# Patient Record
Sex: FEMALE | Race: OTHER | HISPANIC OR LATINO | URBAN - METROPOLITAN AREA
[De-identification: names, ages, dates, MRNs, and addresses within clinical notes are randomized per-mention and may not be internally consistent; named-entity substitution may affect disease eponyms.]

---

## 2024-01-18 ENCOUNTER — OUTPATIENT (OUTPATIENT)
Dept: OUTPATIENT SERVICES | Facility: HOSPITAL | Age: 89
LOS: 1 days | End: 2024-01-18
Payer: MEDICARE

## 2024-01-18 ENCOUNTER — RESULT REVIEW (OUTPATIENT)
Age: 89
End: 2024-01-18

## 2024-01-18 ENCOUNTER — APPOINTMENT (OUTPATIENT)
Dept: CT IMAGING | Facility: HOSPITAL | Age: 89
End: 2024-01-18
Payer: MEDICARE

## 2024-01-18 PROBLEM — Z00.00 ENCOUNTER FOR PREVENTIVE HEALTH EXAMINATION: Status: ACTIVE | Noted: 2024-01-18

## 2024-01-18 PROCEDURE — 74174 CTA ABD&PLVS W/CONTRAST: CPT

## 2024-01-18 PROCEDURE — 71275 CT ANGIOGRAPHY CHEST: CPT | Mod: 26,MH

## 2024-01-18 PROCEDURE — 75573 CT HRT C+ STRUX CGEN HRT DS: CPT | Mod: 26,MH

## 2024-01-18 PROCEDURE — 71275 CT ANGIOGRAPHY CHEST: CPT

## 2024-01-18 PROCEDURE — 74174 CTA ABD&PLVS W/CONTRAST: CPT | Mod: 26,MH

## 2024-01-18 PROCEDURE — 75573 CT HRT C+ STRUX CGEN HRT DS: CPT

## 2024-02-05 ENCOUNTER — APPOINTMENT (OUTPATIENT)
Dept: ULTRASOUND IMAGING | Facility: HOSPITAL | Age: 89
End: 2024-02-05

## 2024-02-05 ENCOUNTER — APPOINTMENT (OUTPATIENT)
Dept: CARDIOTHORACIC SURGERY | Facility: CLINIC | Age: 89
End: 2024-02-05
Payer: MEDICARE

## 2024-02-05 ENCOUNTER — OUTPATIENT (OUTPATIENT)
Dept: OUTPATIENT SERVICES | Facility: HOSPITAL | Age: 89
LOS: 1 days | End: 2024-02-05
Payer: MEDICARE

## 2024-02-05 ENCOUNTER — NON-APPOINTMENT (OUTPATIENT)
Age: 89
End: 2024-02-05

## 2024-02-05 VITALS
HEIGHT: 63 IN | SYSTOLIC BLOOD PRESSURE: 147 MMHG | TEMPERATURE: 97.5 F | WEIGHT: 135 LBS | HEART RATE: 64 BPM | OXYGEN SATURATION: 92 % | RESPIRATION RATE: 15 BRPM | BODY MASS INDEX: 23.92 KG/M2 | DIASTOLIC BLOOD PRESSURE: 70 MMHG

## 2024-02-05 DIAGNOSIS — Z87.891 PERSONAL HISTORY OF NICOTINE DEPENDENCE: ICD-10-CM

## 2024-02-05 DIAGNOSIS — Z83.2 FAMILY HISTORY OF DISEASES OF THE BLOOD AND BLOOD-FORMING ORGANS AND CERTAIN DISORDERS INVOLVING THE IMMUNE MECHANISM: ICD-10-CM

## 2024-02-05 DIAGNOSIS — Z87.39 PERSONAL HISTORY OF OTHER DISEASES OF THE MUSCULOSKELETAL SYSTEM AND CONNECTIVE TISSUE: ICD-10-CM

## 2024-02-05 DIAGNOSIS — Z85.828 PERSONAL HISTORY OF OTHER MALIGNANT NEOPLASM OF SKIN: ICD-10-CM

## 2024-02-05 DIAGNOSIS — Z63.4 DISAPPEARANCE AND DEATH OF FAMILY MEMBER: ICD-10-CM

## 2024-02-05 DIAGNOSIS — I35.0 NONRHEUMATIC AORTIC (VALVE) STENOSIS: ICD-10-CM

## 2024-02-05 DIAGNOSIS — Z86.79 PERSONAL HISTORY OF OTHER DISEASES OF THE CIRCULATORY SYSTEM: ICD-10-CM

## 2024-02-05 DIAGNOSIS — Z85.3 PERSONAL HISTORY OF MALIGNANT NEOPLASM OF BREAST: ICD-10-CM

## 2024-02-05 DIAGNOSIS — Z86.69 PERSONAL HISTORY OF OTHER DISEASES OF THE NERVOUS SYSTEM AND SENSE ORGANS: ICD-10-CM

## 2024-02-05 DIAGNOSIS — Z87.898 PERSONAL HISTORY OF OTHER SPECIFIED CONDITIONS: ICD-10-CM

## 2024-02-05 LAB
ALBUMIN SERPL ELPH-MCNC: 3.7 G/DL — SIGNIFICANT CHANGE UP (ref 3.3–5)
ALP SERPL-CCNC: 79 U/L — SIGNIFICANT CHANGE UP (ref 40–120)
ALT FLD-CCNC: 8 U/L — LOW (ref 10–45)
ANION GAP SERPL CALC-SCNC: 8 MMOL/L — SIGNIFICANT CHANGE UP (ref 5–17)
APTT BLD: 33.4 SEC — SIGNIFICANT CHANGE UP (ref 24.5–35.6)
AST SERPL-CCNC: 15 U/L — SIGNIFICANT CHANGE UP (ref 10–40)
BASOPHILS # BLD AUTO: 0.05 K/UL — SIGNIFICANT CHANGE UP (ref 0–0.2)
BASOPHILS NFR BLD AUTO: 0.8 % — SIGNIFICANT CHANGE UP (ref 0–2)
BILIRUB SERPL-MCNC: 0.4 MG/DL — SIGNIFICANT CHANGE UP (ref 0.2–1.2)
BUN SERPL-MCNC: 12 MG/DL — SIGNIFICANT CHANGE UP (ref 7–23)
CALCIUM SERPL-MCNC: 9.4 MG/DL — SIGNIFICANT CHANGE UP (ref 8.4–10.5)
CHLORIDE SERPL-SCNC: 105 MMOL/L — SIGNIFICANT CHANGE UP (ref 96–108)
CO2 SERPL-SCNC: 29 MMOL/L — SIGNIFICANT CHANGE UP (ref 22–31)
CREAT SERPL-MCNC: 0.71 MG/DL — SIGNIFICANT CHANGE UP (ref 0.5–1.3)
EGFR: 81 ML/MIN/1.73M2 — SIGNIFICANT CHANGE UP
EOSINOPHIL # BLD AUTO: 0.28 K/UL — SIGNIFICANT CHANGE UP (ref 0–0.5)
EOSINOPHIL NFR BLD AUTO: 4.3 % — SIGNIFICANT CHANGE UP (ref 0–6)
GLUCOSE SERPL-MCNC: 82 MG/DL — SIGNIFICANT CHANGE UP (ref 70–99)
HCT VFR BLD CALC: 41.6 % — SIGNIFICANT CHANGE UP (ref 34.5–45)
HGB BLD-MCNC: 14.2 G/DL — SIGNIFICANT CHANGE UP (ref 11.5–15.5)
IMM GRANULOCYTES NFR BLD AUTO: 0.3 % — SIGNIFICANT CHANGE UP (ref 0–0.9)
INR BLD: 0.95 — SIGNIFICANT CHANGE UP (ref 0.85–1.18)
LYMPHOCYTES # BLD AUTO: 2.61 K/UL — SIGNIFICANT CHANGE UP (ref 1–3.3)
LYMPHOCYTES # BLD AUTO: 39.7 % — SIGNIFICANT CHANGE UP (ref 13–44)
MCHC RBC-ENTMCNC: 31.8 PG — SIGNIFICANT CHANGE UP (ref 27–34)
MCHC RBC-ENTMCNC: 34.1 GM/DL — SIGNIFICANT CHANGE UP (ref 32–36)
MCV RBC AUTO: 93.3 FL — SIGNIFICANT CHANGE UP (ref 80–100)
MONOCYTES # BLD AUTO: 0.52 K/UL — SIGNIFICANT CHANGE UP (ref 0–0.9)
MONOCYTES NFR BLD AUTO: 7.9 % — SIGNIFICANT CHANGE UP (ref 2–14)
NEUTROPHILS # BLD AUTO: 3.1 K/UL — SIGNIFICANT CHANGE UP (ref 1.8–7.4)
NEUTROPHILS NFR BLD AUTO: 47 % — SIGNIFICANT CHANGE UP (ref 43–77)
NRBC # BLD: 0 /100 WBCS — SIGNIFICANT CHANGE UP (ref 0–0)
NT-PROBNP SERPL-SCNC: 489 PG/ML — HIGH (ref 0–300)
PLATELET # BLD AUTO: 217 K/UL — SIGNIFICANT CHANGE UP (ref 150–400)
POTASSIUM SERPL-MCNC: 4.5 MMOL/L — SIGNIFICANT CHANGE UP (ref 3.5–5.3)
POTASSIUM SERPL-SCNC: 4.5 MMOL/L — SIGNIFICANT CHANGE UP (ref 3.5–5.3)
PROT SERPL-MCNC: 6.6 G/DL — SIGNIFICANT CHANGE UP (ref 6–8.3)
PROTHROM AB SERPL-ACNC: 10.8 SEC — SIGNIFICANT CHANGE UP (ref 9.5–13)
RBC # BLD: 4.46 M/UL — SIGNIFICANT CHANGE UP (ref 3.8–5.2)
RBC # FLD: 13.3 % — SIGNIFICANT CHANGE UP (ref 10.3–14.5)
SODIUM SERPL-SCNC: 142 MMOL/L — SIGNIFICANT CHANGE UP (ref 135–145)
WBC # BLD: 6.58 K/UL — SIGNIFICANT CHANGE UP (ref 3.8–10.5)
WBC # FLD AUTO: 6.58 K/UL — SIGNIFICANT CHANGE UP (ref 3.8–10.5)

## 2024-02-05 PROCEDURE — 80053 COMPREHEN METABOLIC PANEL: CPT

## 2024-02-05 PROCEDURE — 36415 COLL VENOUS BLD VENIPUNCTURE: CPT

## 2024-02-05 PROCEDURE — 86850 RBC ANTIBODY SCREEN: CPT

## 2024-02-05 PROCEDURE — 83880 ASSAY OF NATRIURETIC PEPTIDE: CPT

## 2024-02-05 PROCEDURE — 86900 BLOOD TYPING SEROLOGIC ABO: CPT

## 2024-02-05 PROCEDURE — 85730 THROMBOPLASTIN TIME PARTIAL: CPT

## 2024-02-05 PROCEDURE — 85610 PROTHROMBIN TIME: CPT

## 2024-02-05 PROCEDURE — 85025 COMPLETE CBC W/AUTO DIFF WBC: CPT

## 2024-02-05 PROCEDURE — 99204 OFFICE O/P NEW MOD 45 MIN: CPT

## 2024-02-05 PROCEDURE — 86901 BLOOD TYPING SEROLOGIC RH(D): CPT

## 2024-02-05 SDOH — SOCIAL STABILITY - SOCIAL INSECURITY: DISSAPEARANCE AND DEATH OF FAMILY MEMBER: Z63.4

## 2024-02-08 PROBLEM — Z85.3 HISTORY OF LEFT BREAST CANCER: Status: RESOLVED | Noted: 2024-02-08 | Resolved: 2024-02-08

## 2024-02-08 PROBLEM — Z87.891 FORMER SMOKER: Status: ACTIVE | Noted: 2024-02-08

## 2024-02-08 PROBLEM — Z86.69 HISTORY OF GLAUCOMA: Status: RESOLVED | Noted: 2024-02-08 | Resolved: 2024-02-08

## 2024-02-08 PROBLEM — Z83.2 FAMILY HISTORY OF THROMBOCYTOPENIA: Status: ACTIVE | Noted: 2024-02-08

## 2024-02-08 PROBLEM — Z63.4 WIDOW: Status: ACTIVE | Noted: 2024-02-08

## 2024-02-08 PROBLEM — I35.0 AORTIC STENOSIS: Status: ACTIVE | Noted: 2024-02-08

## 2024-02-08 PROBLEM — Z87.39 HISTORY OF GOUT: Status: RESOLVED | Noted: 2024-02-08 | Resolved: 2024-02-08

## 2024-02-08 PROBLEM — Z86.79 HISTORY OF CORONARY ARTERY DISEASE: Status: RESOLVED | Noted: 2024-02-08 | Resolved: 2024-02-08

## 2024-02-08 PROBLEM — Z87.898 FORMER CONSUMPTION OF ALCOHOL: Status: ACTIVE | Noted: 2024-02-08

## 2024-02-08 PROBLEM — Z85.828 HISTORY OF MALIGNANT NEOPLASM OF SKIN: Status: RESOLVED | Noted: 2024-02-08 | Resolved: 2024-02-08

## 2024-02-08 RX ORDER — TIMOLOL MALEATE 5 MG/ML
0.5 SOLUTION OPHTHALMIC
Refills: 0 | Status: ACTIVE | COMMUNITY

## 2024-02-08 RX ORDER — PREDNISOLONE ACETATE 10 MG/ML
1 SUSPENSION/ DROPS OPHTHALMIC
Refills: 0 | Status: ACTIVE | COMMUNITY

## 2024-02-08 NOTE — RESULTS/DATA
[TextEntry] : - EKG ([facility] 01/08/24): Afib w/HR 99bpm, no ischemic changes - TTE ([facility] 01/09/24): LVEF 55-60%. Mild LVH. Mod MAC. Mild-mod MR. Mod AS (PV 2.95, MG 20.63) but appeared more severe visually. Mild TR. - LHC/RHC ([facility] 01/10/24): LM normal. 40% pLAD stenosis. Calcified 70-80% mLAD stenosis. LI of LCx and RCA (dominant). RA 8, RV 34/7, PA 33/19 (25), PCWP 18, LVEDP 12. . TD CO/CI 5/2.87. Arina CO/CI 5.25/3.02. AV MG 39.5. GREGOR 0.86 cm2. SVI 33 mL/m2 - Structural CT ([facility] 01/18/24): Calcified trileaflet AV. Calcific plaque obscure pLAD lumen, unable to r/o significant stenosis. Remaining segments non-obstructive. - EKG ([facility] 2/4/24): SR rate 64, MT 164ms, QRS 72, QTc 397, no ischemic changes.

## 2024-02-08 NOTE — PLAN
[TextEntry] : - CAD and LFLG severe AS, had long discussion with son and patient regarding disease process.  Pt and son would like to think about it prior to moving forward with a decision.  Procedure itself, perioperative events, risks and benefit discussed.     - recommend proceeding with PCI, staged TAVR if patient and son is amenable.  - continue current medication regimen.  - labs and carotid US today - follow up with Dr. Matias for ongoing cardiology care and discussion of her CAD/LFLG severe AS management/treatment.    I, Dr. Kaylynn Arvizu, personally performed the evaluation and management (E/M) services for this new patient. That E/M includes conducting the clinically appropriate initial history &/or exam, assessing all conditions, and establishing the plan of care. Today, my LOTTIE, was here to observe my evaluation and management service for this patient & follow plan of care established by me going forward.

## 2024-02-08 NOTE — HISTORY OF PRESENT ILLNESS
[Hypertension] : Hypertension [Syncope] : Syncope [Anginal Classification within 2 wks] : Angina over the last 2 weeks [No angina, No symptoms] : No symptoms of [Heart Failure within 2 Weeks] : Heart Failure in last 2 weeks [Class III] : Class III [A fib / A flutter] : A fib / A flutter [Paroxysmal] : Paroxysmal [FreeTextEntry1] : 89 Y female with PMH of HTN, L-breast cancer (s/p mastectomy/chemo 2011), skin cancer, R corneal transplant (2023), w/ admission 1/8-1/13/24 for syncope and was found to have new afib (not on AC) and severe LFLG aortic stenosis referred for surgical consultation of treatment options for aortic stenosis.   Pt was admitted 1/8 after e/o syncope and fall at home. Patient is AAOx3, and able to provide hx w/supplementation by son, Jose. Pt reports she was in the kitchen just finished making pizza for her son. Stove was off and she had finished cleaning up, then she felt that her blood pressure was dropping, followed by a sudden LOC and fell and hit her head. Pt denies any prodrome or symptoms prior to event including CP, palp, SOB. No witness at time of fall. Neighbor came and found her on the ground, and she regained consciousness shortly after. Pt alert and oriented with no neurologic deficit upon arousal. Patient has reported history of hypotension and fainting attacks (last one when she was in [place] 3 years ago when she was suffering from COVID). Patient was admitted to telemetry at [facility] for syncope work up. EKG on admission was consistent with a-fib. Troponin were negative and CT head and CT cervical spine were negative for any acute pathology. TTE on 1/9/24 demonstrated normal LV function but concerning for mod-severe AS. Patient underwent left and right cardiac cath which revealed significant mid LAD lesion and low flow low gradient severe aortic stenosis. It was recommended at that time pt be transferred to [facility] for further workup and possible intervention for AV disease, however pt and son declined as pt was feeling distraught from being in hospital but were amenable to close outpt f/u.  Since discharge, pt has overall been doing well. No further syncopal episodes. Endorses 2-3 block exercise tolerance limited by shortness of breath and fatigue. Has stable BLE edema. Denies CP, palp, SOB at rest, PND, orthopnea, abdominal bloating, fever, chills, dysuria or active dental issue. Mild neck pain after prior fall, seeing pain management specialist for it. Both she and and her son are sad due to the loss of their [place] Terrier that they had for 16 years. Patient is a retired , lives with her son in a first floor apartment. She is a , independent with her basic ADLs and needs help with iADLs, does not use any assist device when walking albeit could benefit from having one. Her son is inquiring for a  that can assist them in getting help for his mom.   Per Jose, they are holding off on starting his mom on anticoagulation for afib until her thrombophilia evaluation has been completed.    [Diabetes Mellitus] : no Diabetes Melllitus [Dyslipidemia] : no dyslipidemia [Dialysis] : no dialysis [Infectious Endocarditis] : no infectious endocarditis [Home Oxygen] : no home oxygen use [Inhaled Medication Therapy] : no inhaled medication therapy [Sleep Apnea] : no sleep apnea [Liver Disease] : no liver disease [Immunocompromise Present] : not immunocompromised [Peripheral Artery Disease] : no peripheral artery disease [Unresponsive Neurologic State] : not in a unresponsive neurologic state [Cerebrovascular Disease] : no cerebrovascular disease [Prior CVA] : with no prior CVA [CVD TIA] : no CVD Tia [Prior Myocardial Infarction] : No prior myocardial infarction [V tach / V fib] :  but no V tach/V fib [2nd Degree Heart Block] : no second degree heart block [Sick Sinus Syndrome] : no sick sinus syndrome [3rd Degree Heart Block] : no third degree heart block

## 2024-02-08 NOTE — PHYSICAL EXAM
[General Appearance - Alert] : alert [General Appearance - In No Acute Distress] : in no acute distress [General Appearance - Well Nourished] : well nourished [General Appearance - Well Developed] : well developed [Sclera] : the sclera and conjunctiva were normal [Respiration, Rhythm And Depth] : normal respiratory rhythm and effort [Exaggerated Use Of Accessory Muscles For Inspiration] : no accessory muscle use [Auscultation Breath Sounds / Voice Sounds] : lungs were clear to auscultation bilaterally [Heart Rate And Rhythm] : heart rate was normal and rhythm regular [Heart Sounds] : normal S1 and S2 [Heart Sounds Gallop] : no gallops [Heart Sounds Pericardial Friction Rub] : no pericardial rub [Systolic grade ___/6] : A grade [unfilled]/6 systolic murmur was heard. [Examination Of The Chest] : the chest was normal in appearance [Chest Visual Inspection Thoracic Asymmetry] : no chest asymmetry [Bowel Sounds] : normal bowel sounds [Abdomen Soft] : soft [Abdomen Tenderness] : non-tender [Abdomen Mass (___ Cm)] : no abdominal mass palpated [Nail Clubbing] : no clubbing  or cyanosis of the fingernails [Skin Color & Pigmentation] : normal skin color and pigmentation [] : no rash [Skin Lesions] : no skin lesions [No Focal Deficits] : no focal deficits [Oriented To Time, Place, And Person] : oriented to person, place, and time [Impaired Insight] : insight and judgment were intact [FreeTextEntry1] : trace BLE edema

## 2024-02-08 NOTE — ASSESSMENT
[FreeTextEntry1] : 89 Y female with PMH of HTN, L-breast cancer (s/p mastectomy/chemo 2011), skin cancer, R corneal transplant (2023), w/ admission 1/8-1/13/24 for syncope and was found to have new afib (not on AC) and severe LFLG aortic stenosis referred for surgical consultation of treatment options for aortic stenosis. Patient is clinically stable; NYHA Class III. Dr. Arvizu have independently seen and evaluated the patient in this face-to-face encounter. Dr. Arvizu have reviewed patient's history and pertinent clinical data. Cardiac catheterization confirmed patient has low flow low gradient severe AS as well as an obstructive mid LAD coronary disease. Treatment options including surgical, transcatheter, and medical therapy were discussed with patient and her son. Dr. Arvizu deemed patient a high surgical risk due to age, frailty and comorbidities and recommends treating her CAD with PCI and her LFLG severe aortic stenosis with staged TAVR procedure after LAD PCI. The TAVR procedure including the risks (including but not limited to bleeding, infection, stroke, need for permanent pacemaker, heart attack, and death), benefits and alternatives were discussed at length with patient and her son. Patient and son wish to think about the procedures some more and is not ready to schedule any procedure at this time. We will check labs work today and carotid US to complete her TAVR work up. They will follow up with Dr. Matias for ongoing cardiology care and further discussion about the treatment of patient's CAD and LFLG severe AS. All questions were answered.

## 2024-04-05 VITALS
SYSTOLIC BLOOD PRESSURE: 185 MMHG | HEIGHT: 63 IN | HEART RATE: 81 BPM | DIASTOLIC BLOOD PRESSURE: 78 MMHG | OXYGEN SATURATION: 95 % | TEMPERATURE: 97 F | RESPIRATION RATE: 16 BRPM | WEIGHT: 141.98 LBS

## 2024-04-05 RX ORDER — SODIUM CHLORIDE 9 MG/ML
1000 INJECTION INTRAMUSCULAR; INTRAVENOUS; SUBCUTANEOUS
Refills: 0 | Status: DISCONTINUED | OUTPATIENT
Start: 2024-04-09 | End: 2024-04-11

## 2024-04-05 RX ORDER — CHLORHEXIDINE GLUCONATE 213 G/1000ML
1 SOLUTION TOPICAL ONCE
Refills: 0 | Status: DISCONTINUED | OUTPATIENT
Start: 2024-04-09 | End: 2024-04-11

## 2024-04-05 NOTE — H&P ADULT - ASSESSMENT
89 yo Faroese/English speaking F, former smoker, w/ a PMH of HTN, pAF on Eliquis (last dose 4/6 PM), CAD (s/p [facility] 1/8/24 w/ significant mLAD @ [facility]), LF/LG severe AS (GREGOR 0.86 by [facility]), Breast CA (s/p mastectomy/chemo in [place] 2011), skin CA s/p resection, Gout, s/p R corneal transplant 2023 (only 20% vision in R eye), and recent hospitalization 1/8-1/13 @ [facility] for syncope (found to have new AF, CAD and AS), initially presented to outpt cardiologist for routine f/u after discharge. Pt endorses continued dizziness but denies any recurrent syncopal episodes since hospitalization. She also endorses generalized fatigued and decreased ET with LE edema. TTE 1/9/24: LVEF 55-60%, mild LVH, mild-mod MR, mod-severe AS (PV 2.95, MG 20.63), mild TR. R+[facility] 1/10/24 @ [facility]: LM normal, pLAD 40%, mLAD 70-80% (calcified), LCx luminal, RCA luminal. RA 8, RV 34/7, PA 33/19 (25), PCPW 18, LVEDP 12, , TD CO/CI 5/2.78, Arina CO/CI 5.25/3.02, severe AS (GREGOR 0.86, MG 39.5, SVI 33). In light of pts risk factors, CCS class II anginal equivalent symptoms and known CAD, pt now presents to [facility] for recommended staged PCI of mLAD w/ BAV, prior to TAVR in May.    -H/H = 14.6/43. Pt denies BRBPR, hematuria, hematochezia, melena. Pt loaded 325mg ASA x1 and Plavix 600mg x1 pre cath   -Cr = _____. EF normal with severe LF/LG AS. 2+ b/l LE edema, lungs CTA. IVF with NS @ 30cc/hr started pre procedure KVO  -Type of sedation: moderate  -Candidate for sedation: yes    88 yo Niuean/English speaking F, former smoker, w/ a PMH of HTN, pAF on Eliquis (last dose 4/6 PM), CAD (s/p [facility] 1/8/24 w/ significant mLAD @ [facility]), LF/LG severe AS (GREGOR 0.86 by [facility]), Breast CA (s/p mastectomy/chemo in [place] 2011), skin CA s/p resection, Gout, s/p R corneal transplant 2023 (only 20% vision in R eye), and recent hospitalization 1/8-1/13 @ [facility] for syncope (found to have new AF, CAD and AS), initially presented to outpt cardiologist for routine f/u after discharge. Pt endorses continued dizziness but denies any recurrent syncopal episodes since hospitalization. She also endorses generalized fatigued and decreased ET with LE edema. TTE 1/9/24: LVEF 55-60%, mild LVH, mild-mod MR, mod-severe AS (PV 2.95, MG 20.63), mild TR. R+[facility] 1/10/24 @ [facility]: LM normal, pLAD 40%, mLAD 70-80% (calcified), LCx luminal, RCA luminal. RA 8, RV 34/7, PA 33/19 (25), PCPW 18, LVEDP 12, , TD CO/CI 5/2.78, Arina CO/CI 5.25/3.02, severe AS (GREGOR 0.86, MG 39.5, SVI 33). In light of pts risk factors, CCS class II anginal equivalent symptoms and known CAD, pt now presents to [facility] for recommended staged PCI of mLAD w/ BAV, prior to TAVR in May.    -H/H = 14.6/43. Pt denies BRBPR, hematuria, hematochezia, melena. Pt loaded 325mg ASA x1 and Plavix 600mg x1 pre cath   -Cr = 0.72. EF normal with severe LF/LG AS. 2+ b/l LE edema, lungs CTA. IVF with NS @ 30cc/hr started pre procedure KVO  -Type of sedation: moderate  -Candidate for sedation: yes

## 2024-04-05 NOTE — H&P ADULT - NSICDXPASTMEDICALHX_GEN_ALL_CORE_FT
PAST MEDICAL HISTORY:  Atrial fibrillation     Breast cancer     CAD (coronary artery disease)     Gout     HTN (hypertension)     Severe aortic stenosis

## 2024-04-05 NOTE — H&P ADULT - NSICDXPASTSURGICALHX_GEN_ALL_CORE_FT
PAST SURGICAL HISTORY:  History of bilateral mastectomy     Post corneal transplant      PAST SURGICAL HISTORY:  History of bilateral mastectomy     Post corneal transplant     S/P appendectomy     S/P skin cancer resection

## 2024-04-05 NOTE — H&P ADULT - HISTORY OF PRESENT ILLNESS
Cardiologist - Dr. Matias  Pharmacy -  [facility] -    89F w/ PMHx of HTN, pAF on Eliquis (last dose ___), CAD (s/p C 1/8/24 w/ significant mLAD @ [facility]), LF/LG severe AS (GREGOR __), Breast CA (s/p mastectomy/chemo 2011), Gout, s/p R corneal transplant 2023 and recent hospitalization 1/8-1/13 @ [facility] for syncope (found to have new AF, CAD and AS), initially presented to outpt cardiologist for routine f/u after discharge. Pt endorses continued dizziness but denies any recurrent syncopal episodes since hospitalization. She also endorses generalized fatigued and decreased ET. Pt denies any ___ CP, palpitations, LE edema, AQUINO, orthopnea, PND, N/V, fever, chills or recent sick contact.  Cardiologist - Dr. Matias  Pharmacy -  [facility] -    89F, Georgian speaking, w/ PMHx of HTN, pAF on Eliquis (last dose 4/6/24), CAD (s/p [facility] 1/8/24 w/ significant mLAD @ [facility]), LF/LG severe AS (GREGOR 0.86 by [facility]), Breast CA (s/p mastectomy/chemo in [place] 2011), Gout, s/p R corneal transplant 2023 and recent hospitalization 1/8-1/13 @ [facility] for syncope (found to have new AF, CAD and AS), initially presented to outpt cardiologist for routine f/u after discharge. Pt endorses continued dizziness but denies any recurrent syncopal episodes since hospitalization. She also endorses generalized fatigued and decreased ET. Pt denies any ___ CP, palpitations, LE edema, AQUINO, orthopnea, PND, N/V, fever, chills or recent sick contact.     TTE 1/9/24: LVEF 55-60%, mild LVH, mild-mod MR, mod-severe AS (PV 2.95, MG 20.63), mild TR  R+[facility] 1/10/24 @ [facility]: LM normal, pLAD 40%, mLAD 70-80% (calcified), LCx luminal, RCA luminal. RA 8, RV 34/7, PA 33/19 (25), PCPW 18, LVEDP 12, , TD CO/CI 5/2.78, Arina CO/CI 5.25/3.02, severe AS (GREGOR 0.86, MG 39.5, SVI 33).    In light of pts risk factors, CCS class __ anginal symptoms and known CAD, pt now presents to [facility] for recommended staged PCI of mLAD w/ BAV, prior to TAVR in May. Cardiologist - Dr. Matias  Pharmacy -  [facility] -    89F, Ukrainian speaking, w/ PMHx of HTN, pAF on Eliquis (last dose ___), CAD (s/p [facility] 1/8/24 w/ significant mLAD @ [facility]), LF/LG severe AS (GREGOR 0.86 by [facility]), Breast CA (s/p mastectomy/chemo in [place] 2011), Gout, s/p R corneal transplant 2023 and recent hospitalization 1/8-1/13 @ [facility] for syncope (found to have new AF, CAD and AS), initially presented to outpt cardiologist for routine f/u after discharge. Pt endorses continued dizziness but denies any recurrent syncopal episodes since hospitalization. She also endorses generalized fatigued and decreased ET. Pt denies any ___ CP, palpitations, LE edema, AQUINO, orthopnea, PND, N/V, fever, chills or recent sick contact.     TTE 1/9/24: LVEF 55-60%, mild LVH, mild-mod MR, mod-severe AS (PV 2.95, MG 20.63), mild TR  R+[facility] 1/10/24 @ [facility]: LM normal, pLAD 40%, mLAD 70-80% (calcified), LCx luminal, RCA luminal. RA 8, RV 34/7, PA 33/19 (25), PCPW 18, LVEDP 12, , TD CO/CI 5/2.78, Arina CO/CI 5.25/3.02, severe AS (GREGOR 0.86, MG 39.5, SVI 33).    In light of pts risk factors, CCS class __ anginal symptoms and known CAD, pt now presents to [facility] for recommended staged PCI of mLAD w/ BAV, prior to TAVR in May. Cardiologist - Dr. Matias  Pharmacy - [facility]  [facility] - Son (Triston)    88 yo Guinean/English speaking F, former smoker, w/ a PMH of HTN, pAF on Eliquis (last dose 4/6 PM), CAD (s/p [facility] 1/8/24 w/ significant mLAD @ [facility]), LF/LG severe AS (GREGOR 0.86 by [facility]), Breast CA (s/p mastectomy/chemo in [place] 2011), skin CA s/p resection, Gout, s/p R corneal transplant 2023 (only 20% vision in R eye), and recent hospitalization 1/8-1/13 @ [facility] for syncope (found to have new AF, CAD and AS), initially presented to outpt cardiologist for routine f/u after discharge. Pt endorses continued dizziness but denies any recurrent syncopal episodes since hospitalization. She also endorses generalized fatigued and decreased ET with LE edema. Pt denies any CP, palpitations, orthopnea, PND, N/V, BRBPR, hematuria. TTE 1/9/24: LVEF 55-60%, mild LVH, mild-mod MR, mod-severe AS (PV 2.95, MG 20.63), mild TR. R+[facility] 1/10/24 @ [facility]: LM normal, pLAD 40%, mLAD 70-80% (calcified), LCx luminal, RCA luminal. RA 8, RV 34/7, PA 33/19 (25), PCPW 18, LVEDP 12, , TD CO/CI 5/2.78, Arina CO/CI 5.25/3.02, severe AS (GREGOR 0.86, MG 39.5, SVI 33). In light of pts risk factors, CCS class II anginal equivalent symptoms and known CAD, pt now presents to [facility] for recommended staged PCI of mLAD w/ BAV, prior to TAVR in May.

## 2024-04-09 ENCOUNTER — INPATIENT (INPATIENT)
Facility: HOSPITAL | Age: 89
LOS: 1 days | Discharge: HOME | DRG: 320 | End: 2024-04-11
Attending: STUDENT IN AN ORGANIZED HEALTH CARE EDUCATION/TRAINING PROGRAM | Admitting: STUDENT IN AN ORGANIZED HEALTH CARE EDUCATION/TRAINING PROGRAM
Payer: MEDICARE

## 2024-04-09 DIAGNOSIS — Z90.13 ACQUIRED ABSENCE OF BILATERAL BREASTS AND NIPPLES: Chronic | ICD-10-CM

## 2024-04-09 DIAGNOSIS — Z94.7 CORNEAL TRANSPLANT STATUS: Chronic | ICD-10-CM

## 2024-04-09 DIAGNOSIS — Z90.49 ACQUIRED ABSENCE OF OTHER SPECIFIED PARTS OF DIGESTIVE TRACT: Chronic | ICD-10-CM

## 2024-04-09 DIAGNOSIS — Z98.890 OTHER SPECIFIED POSTPROCEDURAL STATES: Chronic | ICD-10-CM

## 2024-04-09 LAB
A1C WITH ESTIMATED AVERAGE GLUCOSE RESULT: 5.7 % — HIGH (ref 4–5.6)
ALBUMIN SERPL ELPH-MCNC: 3.3 G/DL — SIGNIFICANT CHANGE UP (ref 3.3–5)
ALBUMIN SERPL ELPH-MCNC: 3.9 G/DL — SIGNIFICANT CHANGE UP (ref 3.3–5)
ALP SERPL-CCNC: 47 U/L — SIGNIFICANT CHANGE UP (ref 40–120)
ALP SERPL-CCNC: 59 U/L — SIGNIFICANT CHANGE UP (ref 40–120)
ALT FLD-CCNC: 11 U/L — SIGNIFICANT CHANGE UP (ref 10–45)
ALT FLD-CCNC: SIGNIFICANT CHANGE UP (ref 10–45)
ANION GAP SERPL CALC-SCNC: 2 MMOL/L — LOW (ref 5–17)
ANION GAP SERPL CALC-SCNC: 8 MMOL/L — SIGNIFICANT CHANGE UP (ref 5–17)
APTT BLD: 32.7 SEC — SIGNIFICANT CHANGE UP (ref 24.5–35.6)
APTT BLD: 33.3 SEC — SIGNIFICANT CHANGE UP (ref 24.5–35.6)
APTT BLD: >200 SEC — CRITICAL HIGH (ref 24.5–35.6)
APTT BLD: >200 SEC — CRITICAL HIGH (ref 24.5–35.6)
AST SERPL-CCNC: 17 U/L — SIGNIFICANT CHANGE UP (ref 10–40)
AST SERPL-CCNC: SIGNIFICANT CHANGE UP (ref 10–40)
BASOPHILS # BLD AUTO: 0.06 K/UL — SIGNIFICANT CHANGE UP (ref 0–0.2)
BASOPHILS NFR BLD AUTO: 0.9 % — SIGNIFICANT CHANGE UP (ref 0–2)
BILIRUB SERPL-MCNC: 0.3 MG/DL — SIGNIFICANT CHANGE UP (ref 0.2–1.2)
BILIRUB SERPL-MCNC: 0.3 MG/DL — SIGNIFICANT CHANGE UP (ref 0.2–1.2)
BUN SERPL-MCNC: 16 MG/DL — SIGNIFICANT CHANGE UP (ref 7–23)
BUN SERPL-MCNC: 17 MG/DL — SIGNIFICANT CHANGE UP (ref 7–23)
CALCIUM SERPL-MCNC: 10 MG/DL — SIGNIFICANT CHANGE UP (ref 8.4–10.5)
CALCIUM SERPL-MCNC: 8.7 MG/DL — SIGNIFICANT CHANGE UP (ref 8.4–10.5)
CHLORIDE SERPL-SCNC: 103 MMOL/L — SIGNIFICANT CHANGE UP (ref 96–108)
CHLORIDE SERPL-SCNC: 104 MMOL/L — SIGNIFICANT CHANGE UP (ref 96–108)
CHOLEST SERPL-MCNC: 237 MG/DL — HIGH
CK MB CFR SERPL CALC: 2.2 NG/ML — SIGNIFICANT CHANGE UP (ref 0–6.7)
CK SERPL-CCNC: 65 U/L — SIGNIFICANT CHANGE UP (ref 25–170)
CO2 SERPL-SCNC: 27 MMOL/L — SIGNIFICANT CHANGE UP (ref 22–31)
CO2 SERPL-SCNC: 27 MMOL/L — SIGNIFICANT CHANGE UP (ref 22–31)
CREAT SERPL-MCNC: 0.66 MG/DL — SIGNIFICANT CHANGE UP (ref 0.5–1.3)
CREAT SERPL-MCNC: 0.72 MG/DL — SIGNIFICANT CHANGE UP (ref 0.5–1.3)
EGFR: 80 ML/MIN/1.73M2 — SIGNIFICANT CHANGE UP
EGFR: 84 ML/MIN/1.73M2 — SIGNIFICANT CHANGE UP
EOSINOPHIL # BLD AUTO: 0.28 K/UL — SIGNIFICANT CHANGE UP (ref 0–0.5)
EOSINOPHIL NFR BLD AUTO: 4.1 % — SIGNIFICANT CHANGE UP (ref 0–6)
ESTIMATED AVERAGE GLUCOSE: 117 MG/DL — HIGH (ref 68–114)
GLUCOSE SERPL-MCNC: 116 MG/DL — HIGH (ref 70–99)
GLUCOSE SERPL-MCNC: 117 MG/DL — HIGH (ref 70–99)
HCT VFR BLD CALC: 37.6 % — SIGNIFICANT CHANGE UP (ref 34.5–45)
HCT VFR BLD CALC: 43 % — SIGNIFICANT CHANGE UP (ref 34.5–45)
HDLC SERPL-MCNC: 59 MG/DL — SIGNIFICANT CHANGE UP
HGB BLD-MCNC: 12.9 G/DL — SIGNIFICANT CHANGE UP (ref 11.5–15.5)
HGB BLD-MCNC: 14.6 G/DL — SIGNIFICANT CHANGE UP (ref 11.5–15.5)
IMM GRANULOCYTES NFR BLD AUTO: 0.3 % — SIGNIFICANT CHANGE UP (ref 0–0.9)
INR BLD: 0.92 — SIGNIFICANT CHANGE UP (ref 0.85–1.18)
INR BLD: 1.08 — SIGNIFICANT CHANGE UP (ref 0.85–1.18)
INR BLD: 1.15 — SIGNIFICANT CHANGE UP (ref 0.85–1.18)
LIPID PNL WITH DIRECT LDL SERPL: 160 MG/DL — HIGH
LYMPHOCYTES # BLD AUTO: 3.38 K/UL — HIGH (ref 1–3.3)
LYMPHOCYTES # BLD AUTO: 49.8 % — HIGH (ref 13–44)
MAGNESIUM SERPL-MCNC: 1.8 MG/DL — SIGNIFICANT CHANGE UP (ref 1.6–2.6)
MAGNESIUM SERPL-MCNC: 2.1 MG/DL — SIGNIFICANT CHANGE UP (ref 1.6–2.6)
MCHC RBC-ENTMCNC: 31.1 PG — SIGNIFICANT CHANGE UP (ref 27–34)
MCHC RBC-ENTMCNC: 31.5 PG — SIGNIFICANT CHANGE UP (ref 27–34)
MCHC RBC-ENTMCNC: 34 GM/DL — SIGNIFICANT CHANGE UP (ref 32–36)
MCHC RBC-ENTMCNC: 34.3 GM/DL — SIGNIFICANT CHANGE UP (ref 32–36)
MCV RBC AUTO: 91.7 FL — SIGNIFICANT CHANGE UP (ref 80–100)
MCV RBC AUTO: 91.9 FL — SIGNIFICANT CHANGE UP (ref 80–100)
MONOCYTES # BLD AUTO: 0.55 K/UL — SIGNIFICANT CHANGE UP (ref 0–0.9)
MONOCYTES NFR BLD AUTO: 8.1 % — SIGNIFICANT CHANGE UP (ref 2–14)
NEUTROPHILS # BLD AUTO: 2.5 K/UL — SIGNIFICANT CHANGE UP (ref 1.8–7.4)
NEUTROPHILS NFR BLD AUTO: 36.8 % — LOW (ref 43–77)
NON HDL CHOLESTEROL: 178 MG/DL — HIGH
NRBC # BLD: 0 /100 WBCS — SIGNIFICANT CHANGE UP (ref 0–0)
NRBC # BLD: 0 /100 WBCS — SIGNIFICANT CHANGE UP (ref 0–0)
PHOSPHATE SERPL-MCNC: 4.1 MG/DL — SIGNIFICANT CHANGE UP (ref 2.5–4.5)
PLATELET # BLD AUTO: 204 K/UL — SIGNIFICANT CHANGE UP (ref 150–400)
PLATELET # BLD AUTO: 225 K/UL — SIGNIFICANT CHANGE UP (ref 150–400)
POCT ISTAT CREATININE: 0.7 MG/DL — SIGNIFICANT CHANGE UP (ref 0.5–1.3)
POTASSIUM SERPL-MCNC: 4.2 MMOL/L — SIGNIFICANT CHANGE UP (ref 3.5–5.3)
POTASSIUM SERPL-MCNC: SIGNIFICANT CHANGE UP (ref 3.5–5.3)
POTASSIUM SERPL-SCNC: 4.2 MMOL/L — SIGNIFICANT CHANGE UP (ref 3.5–5.3)
POTASSIUM SERPL-SCNC: SIGNIFICANT CHANGE UP (ref 3.5–5.3)
PROT SERPL-MCNC: 6 G/DL — SIGNIFICANT CHANGE UP (ref 6–8.3)
PROT SERPL-MCNC: 6.7 G/DL — SIGNIFICANT CHANGE UP (ref 6–8.3)
PROTHROM AB SERPL-ACNC: 10.5 SEC — SIGNIFICANT CHANGE UP (ref 9.5–13)
PROTHROM AB SERPL-ACNC: 12.3 SEC — SIGNIFICANT CHANGE UP (ref 9.5–13)
PROTHROM AB SERPL-ACNC: 13.1 SEC — HIGH (ref 9.5–13)
RBC # BLD: 4.09 M/UL — SIGNIFICANT CHANGE UP (ref 3.8–5.2)
RBC # BLD: 4.69 M/UL — SIGNIFICANT CHANGE UP (ref 3.8–5.2)
RBC # FLD: 14.1 % — SIGNIFICANT CHANGE UP (ref 10.3–14.5)
RBC # FLD: 14.1 % — SIGNIFICANT CHANGE UP (ref 10.3–14.5)
SODIUM SERPL-SCNC: 132 MMOL/L — LOW (ref 135–145)
SODIUM SERPL-SCNC: 139 MMOL/L — SIGNIFICANT CHANGE UP (ref 135–145)
THROMBIN TIME: >300 SEC — HIGH (ref 17.6–24)
TRIGL SERPL-MCNC: 101 MG/DL — SIGNIFICANT CHANGE UP
WBC # BLD: 6.79 K/UL — SIGNIFICANT CHANGE UP (ref 3.8–10.5)
WBC # BLD: 6.8 K/UL — SIGNIFICANT CHANGE UP (ref 3.8–10.5)
WBC # FLD AUTO: 6.79 K/UL — SIGNIFICANT CHANGE UP (ref 3.8–10.5)
WBC # FLD AUTO: 6.8 K/UL — SIGNIFICANT CHANGE UP (ref 3.8–10.5)

## 2024-04-09 PROCEDURE — 93010 ELECTROCARDIOGRAM REPORT: CPT

## 2024-04-09 PROCEDURE — 93458 L HRT ARTERY/VENTRICLE ANGIO: CPT | Mod: 26,59

## 2024-04-09 PROCEDURE — 92928 PRQ TCAT PLMT NTRAC ST 1 LES: CPT | Mod: LD

## 2024-04-09 PROCEDURE — 92986 REVISION OF AORTIC VALVE: CPT

## 2024-04-09 PROCEDURE — 99152 MOD SED SAME PHYS/QHP 5/>YRS: CPT

## 2024-04-09 RX ORDER — ACETAMINOPHEN 500 MG
1000 TABLET ORAL ONCE
Refills: 0 | Status: COMPLETED | OUTPATIENT
Start: 2024-04-09 | End: 2024-04-10

## 2024-04-09 RX ORDER — HYDRALAZINE HCL 50 MG
10 TABLET ORAL ONCE
Refills: 0 | Status: DISCONTINUED | OUTPATIENT
Start: 2024-04-09 | End: 2024-04-09

## 2024-04-09 RX ORDER — HYDRALAZINE HCL 50 MG
10 TABLET ORAL ONCE
Refills: 0 | Status: COMPLETED | OUTPATIENT
Start: 2024-04-09 | End: 2024-04-09

## 2024-04-09 RX ORDER — MAGNESIUM OXIDE 400 MG ORAL TABLET 241.3 MG
800 TABLET ORAL ONCE
Refills: 0 | Status: COMPLETED | OUTPATIENT
Start: 2024-04-09 | End: 2024-04-09

## 2024-04-09 RX ORDER — ASPIRIN/CALCIUM CARB/MAGNESIUM 324 MG
325 TABLET ORAL ONCE
Refills: 0 | Status: COMPLETED | OUTPATIENT
Start: 2024-04-09 | End: 2024-04-09

## 2024-04-09 RX ORDER — CLOPIDOGREL BISULFATE 75 MG/1
600 TABLET, FILM COATED ORAL ONCE
Refills: 0 | Status: COMPLETED | OUTPATIENT
Start: 2024-04-09 | End: 2024-04-09

## 2024-04-09 RX ORDER — METHOCARBAMOL 500 MG/1
2 TABLET, FILM COATED ORAL
Refills: 0 | DISCHARGE

## 2024-04-09 RX ADMIN — Medication 325 MILLIGRAM(S): at 08:16

## 2024-04-09 RX ADMIN — MAGNESIUM OXIDE 400 MG ORAL TABLET 800 MILLIGRAM(S): 241.3 TABLET ORAL at 19:09

## 2024-04-09 RX ADMIN — CLOPIDOGREL BISULFATE 600 MILLIGRAM(S): 75 TABLET, FILM COATED ORAL at 08:16

## 2024-04-09 RX ADMIN — SODIUM CHLORIDE 30 MILLILITER(S): 9 INJECTION INTRAMUSCULAR; INTRAVENOUS; SUBCUTANEOUS at 08:17

## 2024-04-09 RX ADMIN — Medication 10 MILLIGRAM(S): at 12:54

## 2024-04-09 NOTE — PATIENT PROFILE ADULT - FALL HARM RISK - HARM RISK INTERVENTIONS

## 2024-04-09 NOTE — PROGRESS NOTE ADULT - ASSESSMENT
88 y/o Senegalese/English speaking F, former smoker, w/ a PMH of HTN, pAF on Eliquis (last dose 4/6/24 PM), CAD (s/p [facility] 1/8/24 w/ significant mLAD @ [facility]), LF/LG severe AS (GREGOR 0.86 by [facility]), Breast CA (s/p mastectomy/chemo in [place] 2011), skin CA s/p resection, Gout, s/p R corneal transplant 2023 (only 20% vision in R eye), and recent hospitalization 1/8-1/13 @ [facility] for syncope (found to have new AF, CAD and AS), initially presented to outpt cardiologist for routine f/u after discharge. Pt endorses continued dizziness but denies any recurrent syncopal episodes since hospitalization. She also endorses generalized fatigued and decreased ET with LE edema.  TTE 1/9/24: LVEF 55-60%, mild LVH, mild-mod MR, mod-severe AS (PV 2.95, MG 20.63), mild TR. R+[facility] 1/10/24 @ [facility]: LM normal, pLAD 40%, mLAD 70-80% (calcified), LCx luminal, RCA luminal. RA 8, RV 34/7, PA 33/19 (25), PCPW 18, LVEDP 12, , TD CO/CI 5/2.78, Arina CO/CI 5.25/3.02, severe AS (GREGOR 0.86, MG 39.5, SVI 33). In light of pts risk factors, CCS class II anginal equivalent symptoms and known CAD, pt now presents to [facility] for recommended staged PCI of mLAD w/ BAV, prior to TAVR in May.  Pt underwent BAV, PCI to mLAD today.  Had a bundle branch block intra-op so TVP kept in right groin.  F/U post op EKG, if narrow can possibly remove line today.  Got ASA/plavix load today.  Plan will be Eliquis/Plavix to start tomorrow if groin stable.      Neuro:  No focal deficits  Pain meds - IV tylenol PRN    CV:  HR/BP stable.  F/U EKG.  If QRS narrow, D/C line later pending PTT  QRS 70-->122-->108.      Pulm:  O2 stat stable on room air    GI:  NPO for now.  Still very groggy.  Dysphagia screen pending    :  F/U UOP    Heme:  Holding SC heparin due to elevated ptt x 2 post op    Home ?tomorrow

## 2024-04-09 NOTE — PATIENT PROFILE ADULT - FUNCTIONAL ASSESSMENT - BASIC MOBILITY 1.
----- Message from Jeremi Shankar Sr. sent at 6/21/2021 10:25 PM EDT -----  Regarding: Prescription Question  Contact: [phone]  Dr. Alvarez could you order my 90 day prescriptions from [facility].  I couldn't get my prescriptions from [facility] because I recently switched insurance and they said I would have to opt out of [facility] to get my prescriptions at [facility]. I now have [employer] retire[employer] as a dependent on my wife's insurance. My ID #[id number] Thank you  
rx attached  
4 = No assist / stand by assistance

## 2024-04-09 NOTE — PROGRESS NOTE ADULT - SUBJECTIVE AND OBJECTIVE BOX
Operation / Date: 4/9/24 BAV, PCI mLAD    SUBJECTIVE ASSESSMENT:  Pt feels okay.  Groggy but able to follow commands.          Vital Signs Last 24 Hrs  T(C): 36.2 (09 Apr 2024 13:14), Max: 36.2 (09 Apr 2024 13:14)  T(F): 97.1 (09 Apr 2024 13:14), Max: 97.1 (09 Apr 2024 13:14)  HR: 87 (09 Apr 2024 15:00) (72 - 96)  BP: 128/61 (09 Apr 2024 15:00) (128/61 - 191/84)  BP(mean): 88 (09 Apr 2024 15:00) (88 - 120)  RR: 12 (09 Apr 2024 15:00) (11 - 18)  SpO2: 99% (09 Apr 2024 15:00) (92% - 100%)    Parameters below as of 09 Apr 2024 15:00  Patient On (Oxygen Delivery Method): nasal cannula w/ humidification  O2 Flow (L/min): 2    I&O's Detail    09 Apr 2024 07:01  -  09 Apr 2024 15:25  --------------------------------------------------------  IN:    sodium chloride 0.9%: 150 mL  Total IN: 150 mL    OUT:  Total OUT: 0 mL    Total NET: 150 mL      PHYSICAL EXAM:  GEN: NAD, looks comfortable  Psych: Mood appropriate  Neuro: A&Ox3.  No focal deficits.  Moving all extremities.   HEENT: No obvious abnormalities  CV: S1S2, regular, no murmurs appreciated.  No carotid bruits.  No JVD  Lungs: Clear B/L.  No wheezing, rales or rhonchi  ABD: Soft, non-tender, non-distended.  +Bowel sounds  EXT: Warm and well perfused.  No peripheral edema noted  Musculoskeletal: Moving all extremities with normal ROM, no joint swelling  PV: Pedal pulses palpable  Incision Sites: Rt groin TVP in place, site is stable no bleeding.      LABS:                        12.9   6.80  )-----------( 204      ( 09 Apr 2024 11:23 )             37.6       PT/INR - ( 09 Apr 2024 11:23 )   PT: 13.1 sec;   INR: 1.15          PTT - ( 09 Apr 2024 14:09 )  PTT:>200.0 sec    04-09    132<L>  |  103  |  16  ----------------------------<  116<H>  See Note   |  27  |  0.66    Ca    8.7      09 Apr 2024 11:23  Phos  4.1     04-09  Mg     1.8     04-09    TPro  6.0  /  Alb  3.3  /  TBili  0.3  /  DBili  x   /  AST  See Note  /  ALT  See Note  /  AlkPhos  47  04-09      Urinalysis Basic - ( 09 Apr 2024 11:23 )    Color: x / Appearance: x / SG: x / pH: x  Gluc: 116 mg/dL / Ketone: x  / Bili: x / Urobili: x   Blood: x / Protein: x / Nitrite: x   Leuk Esterase: x / RBC: x / WBC x   Sq Epi: x / Non Sq Epi: x / Bacteria: x        MEDICATIONS  (STANDING):  chlorhexidine 4% Liquid 1 Application(s) Topical once  sodium chloride 0.9%. 1000 milliLiter(s) (30 mL/Hr) IV Continuous <Continuous>    MEDICATIONS  (PRN):        RADIOLOGY & ADDITIONAL TESTS:

## 2024-04-10 ENCOUNTER — TRANSCRIPTION ENCOUNTER (OUTPATIENT)
Age: 89
End: 2024-04-10

## 2024-04-10 ENCOUNTER — RESULT REVIEW (OUTPATIENT)
Age: 89
End: 2024-04-10

## 2024-04-10 LAB
ANION GAP SERPL CALC-SCNC: 6 MMOL/L — SIGNIFICANT CHANGE UP (ref 5–17)
APTT BLD: 30.5 SEC — SIGNIFICANT CHANGE UP (ref 24.5–35.6)
BASOPHILS # BLD AUTO: 0.04 K/UL — SIGNIFICANT CHANGE UP (ref 0–0.2)
BASOPHILS NFR BLD AUTO: 0.6 % — SIGNIFICANT CHANGE UP (ref 0–2)
BUN SERPL-MCNC: 15 MG/DL — SIGNIFICANT CHANGE UP (ref 7–23)
CALCIUM SERPL-MCNC: 9.1 MG/DL — SIGNIFICANT CHANGE UP (ref 8.4–10.5)
CHLORIDE SERPL-SCNC: 104 MMOL/L — SIGNIFICANT CHANGE UP (ref 96–108)
CO2 SERPL-SCNC: 30 MMOL/L — SIGNIFICANT CHANGE UP (ref 22–31)
CREAT SERPL-MCNC: 0.83 MG/DL — SIGNIFICANT CHANGE UP (ref 0.5–1.3)
EGFR: 67 ML/MIN/1.73M2 — SIGNIFICANT CHANGE UP
EOSINOPHIL # BLD AUTO: 0.18 K/UL — SIGNIFICANT CHANGE UP (ref 0–0.5)
EOSINOPHIL NFR BLD AUTO: 2.8 % — SIGNIFICANT CHANGE UP (ref 0–6)
GLUCOSE SERPL-MCNC: 108 MG/DL — HIGH (ref 70–99)
HCT VFR BLD CALC: 37.6 % — SIGNIFICANT CHANGE UP (ref 34.5–45)
HGB BLD-MCNC: 12.4 G/DL — SIGNIFICANT CHANGE UP (ref 11.5–15.5)
IMM GRANULOCYTES NFR BLD AUTO: 0.3 % — SIGNIFICANT CHANGE UP (ref 0–0.9)
INR BLD: 1.05 — SIGNIFICANT CHANGE UP (ref 0.85–1.18)
LYMPHOCYTES # BLD AUTO: 1.98 K/UL — SIGNIFICANT CHANGE UP (ref 1–3.3)
LYMPHOCYTES # BLD AUTO: 31 % — SIGNIFICANT CHANGE UP (ref 13–44)
MAGNESIUM SERPL-MCNC: 2 MG/DL — SIGNIFICANT CHANGE UP (ref 1.6–2.6)
MCHC RBC-ENTMCNC: 31.2 PG — SIGNIFICANT CHANGE UP (ref 27–34)
MCHC RBC-ENTMCNC: 33 GM/DL — SIGNIFICANT CHANGE UP (ref 32–36)
MCV RBC AUTO: 94.7 FL — SIGNIFICANT CHANGE UP (ref 80–100)
MONOCYTES # BLD AUTO: 0.5 K/UL — SIGNIFICANT CHANGE UP (ref 0–0.9)
MONOCYTES NFR BLD AUTO: 7.8 % — SIGNIFICANT CHANGE UP (ref 2–14)
NEUTROPHILS # BLD AUTO: 3.67 K/UL — SIGNIFICANT CHANGE UP (ref 1.8–7.4)
NEUTROPHILS NFR BLD AUTO: 57.5 % — SIGNIFICANT CHANGE UP (ref 43–77)
NRBC # BLD: 0 /100 WBCS — SIGNIFICANT CHANGE UP (ref 0–0)
PLATELET # BLD AUTO: 187 K/UL — SIGNIFICANT CHANGE UP (ref 150–400)
POTASSIUM SERPL-MCNC: 4.3 MMOL/L — SIGNIFICANT CHANGE UP (ref 3.5–5.3)
POTASSIUM SERPL-SCNC: 4.3 MMOL/L — SIGNIFICANT CHANGE UP (ref 3.5–5.3)
PROTHROM AB SERPL-ACNC: 12 SEC — SIGNIFICANT CHANGE UP (ref 9.5–13)
RBC # BLD: 3.97 M/UL — SIGNIFICANT CHANGE UP (ref 3.8–5.2)
RBC # FLD: 13.9 % — SIGNIFICANT CHANGE UP (ref 10.3–14.5)
SODIUM SERPL-SCNC: 140 MMOL/L — SIGNIFICANT CHANGE UP (ref 135–145)
WBC # BLD: 6.39 K/UL — SIGNIFICANT CHANGE UP (ref 3.8–10.5)
WBC # FLD AUTO: 6.39 K/UL — SIGNIFICANT CHANGE UP (ref 3.8–10.5)

## 2024-04-10 PROCEDURE — 93306 TTE W/DOPPLER COMPLETE: CPT | Mod: 26

## 2024-04-10 PROCEDURE — 93880 EXTRACRANIAL BILAT STUDY: CPT | Mod: 26

## 2024-04-10 RX ORDER — PANTOPRAZOLE SODIUM 20 MG/1
40 TABLET, DELAYED RELEASE ORAL
Refills: 0 | Status: DISCONTINUED | OUTPATIENT
Start: 2024-04-10 | End: 2024-04-11

## 2024-04-10 RX ORDER — PANTOPRAZOLE SODIUM 20 MG/1
1 TABLET, DELAYED RELEASE ORAL
Qty: 30 | Refills: 0
Start: 2024-04-10 | End: 2024-05-09

## 2024-04-10 RX ORDER — CLOPIDOGREL BISULFATE 75 MG/1
1 TABLET, FILM COATED ORAL
Qty: 30 | Refills: 0
Start: 2024-04-10 | End: 2024-05-09

## 2024-04-10 RX ORDER — ACETAMINOPHEN 500 MG
650 TABLET ORAL EVERY 6 HOURS
Refills: 0 | Status: DISCONTINUED | OUTPATIENT
Start: 2024-04-10 | End: 2024-04-11

## 2024-04-10 RX ORDER — ALBUMIN HUMAN 25 %
250 VIAL (ML) INTRAVENOUS ONCE
Refills: 0 | Status: COMPLETED | OUTPATIENT
Start: 2024-04-10 | End: 2024-04-10

## 2024-04-10 RX ORDER — METOPROLOL TARTRATE 50 MG
0.5 TABLET ORAL
Refills: 0 | DISCHARGE

## 2024-04-10 RX ORDER — APIXABAN 2.5 MG/1
1 TABLET, FILM COATED ORAL
Qty: 60 | Refills: 0
Start: 2024-04-10 | End: 2024-05-09

## 2024-04-10 RX ORDER — APIXABAN 2.5 MG/1
1 TABLET, FILM COATED ORAL
Refills: 0 | DISCHARGE

## 2024-04-10 RX ORDER — ACETAMINOPHEN 500 MG
2 TABLET ORAL
Qty: 56 | Refills: 0
Start: 2024-04-10 | End: 2024-04-16

## 2024-04-10 RX ORDER — TIMOLOL 0.5 %
1 DROPS OPHTHALMIC (EYE) DAILY
Refills: 0 | Status: DISCONTINUED | OUTPATIENT
Start: 2024-04-10 | End: 2024-04-11

## 2024-04-10 RX ORDER — CLOPIDOGREL BISULFATE 75 MG/1
75 TABLET, FILM COATED ORAL DAILY
Refills: 0 | Status: DISCONTINUED | OUTPATIENT
Start: 2024-04-10 | End: 2024-04-11

## 2024-04-10 RX ORDER — APIXABAN 2.5 MG/1
2.5 TABLET, FILM COATED ORAL EVERY 12 HOURS
Refills: 0 | Status: DISCONTINUED | OUTPATIENT
Start: 2024-04-10 | End: 2024-04-11

## 2024-04-10 RX ADMIN — Medication 1 DROP(S): at 12:47

## 2024-04-10 RX ADMIN — Medication 125 MILLILITER(S): at 13:46

## 2024-04-10 RX ADMIN — CLOPIDOGREL BISULFATE 75 MILLIGRAM(S): 75 TABLET, FILM COATED ORAL at 12:47

## 2024-04-10 RX ADMIN — Medication 1000 MILLIGRAM(S): at 03:35

## 2024-04-10 RX ADMIN — APIXABAN 2.5 MILLIGRAM(S): 2.5 TABLET, FILM COATED ORAL at 18:00

## 2024-04-10 RX ADMIN — Medication 125 MILLILITER(S): at 18:01

## 2024-04-10 RX ADMIN — Medication 400 MILLIGRAM(S): at 02:54

## 2024-04-10 NOTE — DISCHARGE NOTE PROVIDER - PROVIDER TOKENS
PROVIDER:[TOKEN:[[id number]]] FREE:[LAST:[Florencio],FIRST:[Pasquale],PHONE:[phone]],FAX:[(   )    -],ADDRESS:[[address] [place] [address]],FOLLOWUP:[1 week]]

## 2024-04-10 NOTE — DISCHARGE NOTE PROVIDER - NSDCCPTREATMENT_GEN_ALL_CORE_FT
PRINCIPAL PROCEDURE  Procedure: Valvuloplasty, aortic valve, balloon  Findings and Treatment:       SECONDARY PROCEDURE  Procedure: Percutaneous coronary angioplasty  Findings and Treatment:

## 2024-04-10 NOTE — DISCHARGE NOTE PROVIDER - CARE PROVIDER_API CALL
Pasquale Matias  Cardiovascular Disease  3rd [place] [address]  Phone: [phone]  Fax: [phone]  Follow Up Time:    Pasquale Matias  [address] [place] [address] Floor Cardiology  [place] [id number]  Phone: [phone]  Fax: (   )    -  Follow Up Time: 1 week

## 2024-04-10 NOTE — CHART NOTE - NSCHARTNOTEFT_GEN_A_CORE
Patient didn't urinated in the TOV period and was bladder scanned with only 250cc in the bladder. She received 2 albumin boluses and passed TOV. Patient will leave with family in the morning.     Rabia Gilbert PA-C

## 2024-04-10 NOTE — DISCHARGE NOTE PROVIDER - NSDCCPCAREPLAN_GEN_ALL_CORE_FT
PRINCIPAL DISCHARGE DIAGNOSIS  Diagnosis: Aortic stenosis  Assessment and Plan of Treatment:      PRINCIPAL DISCHARGE DIAGNOSIS  Diagnosis: Aortic stenosis  Assessment and Plan of Treatment: You underwent a BAV and stent in your heart. You need to follow up with Dr. Matias in 1 week.

## 2024-04-10 NOTE — DISCHARGE NOTE PROVIDER - NSDCMRMEDTOKEN_GEN_ALL_CORE_FT
acetaminophen 325 mg oral tablet: 2 tab(s) orally every 6 hours as needed for Mild Pain (1 - 3)  clopidogrel 75 mg oral tablet: 1 tab(s) orally once a day  Eliquis 5 mg oral tablet: 1 tab(s) orally 2 times a day  pantoprazole 40 mg oral delayed release tablet: 1 tab(s) orally once a day (before a meal)  timolol maleate 0.5% ophthalmic solution: 1 drop(s) in each affected eye 2 times a day   acetaminophen 325 mg oral tablet: 2 tab(s) orally every 6 hours As needed Mild Pain (1 - 3)  clopidogrel 75 mg oral tablet: 1 tab(s) orally once a day  Eliquis 5 mg oral tablet: 1 tab(s) orally 2 times a day  pantoprazole 40 mg oral delayed release tablet: 1 tab(s) orally once a day (before a meal)  timolol maleate 0.5% ophthalmic solution: 1 drop(s) in each affected eye 2 times a day

## 2024-04-10 NOTE — DISCHARGE NOTE PROVIDER - HOSPITAL COURSE
Operation Date: 4/9/24 BAV/ PCI to mid LAD.     Primary Surgeon/Attending MD: Dr. Matias     Referring Physician:   _ _ _ _ _ _ _ _ _ _ _ _  HOSPITAL COURSE:  89 y/o Montserratian/English speaking F, former smoker, w/ a PMH of HTN, pAF on Eliquis (last dose 4/6/24 PM), CAD (s/p [facility] 1/8/24 w/ significant mLAD @ [facility]), LF/LG severe AS (GREGOR 0.86 by [facility]), Breast CA (s/p mastectomy/chemo in [place] 2011), skin CA s/p resection, Gout, s/p R corneal transplant 2023 (only 20% vision in R eye), and recent hospitalization 1/8-1/13 @ [facility] for syncope (found to have new AF, CAD and AS), initially presented to outpt cardiologist for routine f/u after discharge. Pt endorses continued dizziness but denies any recurrent syncopal episodes since hospitalization. She also endorses generalized fatigued and decreased ET with LE edema.  TTE 1/9/24: LVEF 55-60%, mild LVH, mild-mod MR, mod-severe AS (PV 2.95, MG 20.63), mild TR. R+[facility] 1/10/24 @ [facility]: LM normal, pLAD 40%, mLAD 70-80% (calcified), LCx luminal, RCA luminal. RA 8, RV 34/7, PA 33/19 (25), PCPW 18, LVEDP 12, , TD CO/CI 5/2.78, Arina CO/CI 5.25/3.02, severe AS (GREGOR 0.86, MG 39.5, SVI 33). In light of pts risk factors, CCS class II anginal equivalent symptoms and known CAD, pt now presents to [facility] for recommended staged PCI of mLAD w/ BAV, prior to TAVR in May.  Pt underwent BAV, PCI to mLAD today.  Had a bundle branch block intra-op so TVP kept in right groin.  Post op EKG with narrow QRS of 70. Overnight patient required a straight cath for urinary retention. Post-op TTE demonstrated... As per Dr. Lemon patient is ready for discharge home today..  Patient discussed on morning rounds with  _____        _ _ _ _ _ _ _ _ _ _ _ _  DISCHARGE PHYSICAL EXAM:  GEN: NAD, looks comfortable  Neuro: A&Ox3.  No focal deficits.  Moving all extremities.   CV: S1S2, regular, no murmurs appreciated.  No carotid bruits.  No JVD  Lungs: Clear B/L.  No wheezing, rales or rhonchi  ABD: Soft, non-tender, non-distended.  +Bowel sounds  EXT: Warm and well perfused.  No peripheral edema noted. All Distal pulses palpable bilaterally.   Musculoskeletal: Moving all extremities with normal ROM, no joint swelling  Bilateral groins c/d/i no ecchymosis or hematoma.   _ _ _   _ _ _ _ _ _ _ _ _     MEDICATION DISCHARGE CHECKLIST        BAV/PCI        [ ] Aspirin, [  ] Contraindicated, Reason_______________________________        [ x ] Plavix, [ ] Contraindicated, Reason _______________________________        Anticoagulation        [ x ] NOAC, [ ] Reason _______________________________              Cost/Insurance barriers addressed: YES/NO    _ _ _ _ _ _ _ _ _ _ _ _  RELEVANT LABS/IMAGING:    _ _ _ _ _ _ _ _ _ _ _ _    _ _ _ _ _ _ _ _ _ _ _ _  Over 35 minutes was spent with the patient reviewing the discharge material including medications, follow up appointments, recovery, concerning symptoms, and how to contact their health care providers if they have questions     Discussed on morning rounds with Dr. Fonseca.    Operation Date: 4/9/24 BAV/ PCI to mid LAD.     Primary Surgeon/Attending MD: Dr. Matias     Referring Physician: none  _ _ _ _ _ _ _ _ _ _ _ _  HOSPITAL COURSE:  89 y/o Yakut/English speaking F, former smoker, w/ a PMH of HTN, pAF on Eliquis, CAD (s/p LHC 1/8/24 w/ significant mLAD at [facility]), LF/LG severe AS, Breast CA (s/p mastectomy/chemo in [place] 2011), skin CA s/p resection, Gout, s/p R corneal transplant 2023 (only 20% vision in R eye), and recent hospitalization 1/8-1/13 at [facility] for syncope (found to have new AF, CAD and AS), initially presented to outpatient cardiologist for routine follow up after discharge. TTE 1/9/24: LVEF 55-60%, mild LVH, mild-mod MR, mod-severe AS (PV 2.95, MG 20.63), mild TR. R/LHC on 1/10/24 at [facility]: LM normal, pLAD 40%, mLAD 70-80% (calcified), LCx luminal, RCA luminal. She presented to [facility] for recommended staged PCI of mLAD w/ BAV, prior to future TAVR. On 4/9/2024 she underwent a BAV and PCI to mLAD with Dr. Matias. Intraop she had a transient bundle branch block. She arrived to the stepdown unit as a mini ICU with groin TVP in place. POD1 TVP removed. TTE with moderate AS and no pericardial effusion. POD2 Patient feels well, has no complaints. Tolerating PO diet, satting well on room air, ambulating in halls. Will go home with OT. Per Dr. Fonseca she is medically stable for discharge home today, 4/11/24.    _ _ _ _ _ _ _ _ _ _ _ _  DISCHARGE PHYSICAL EXAM:  GEN: NAD, looks comfortable  Neuro: A&Ox3.  No focal deficits.  Moving all extremities.   CV: S1S2, regular, no murmurs appreciated.  No carotid bruits.  No JVD  Lungs: Clear B/L.  No wheezing, rales or rhonchi  ABD: Soft, non-tender, non-distended.  +Bowel sounds  EXT: Warm and well perfused.  No peripheral edema noted. All Distal pulses palpable bilaterally.   Musculoskeletal: Moving all extremities with normal ROM, no joint swelling  Bilateral groins c/d/i no ecchymosis or hematoma.      _ _ _ _ _ _ _ _ _     MEDICATION DISCHARGE CHECKLIST        BAV/PCI        [ ] Aspirin, [  ] Contraindicated, Reason_______________________________        [ x ] Plavix, [ ] Contraindicated, Reason _______________________________        Anticoagulation        [ x ] NOAC, [ ] Reason _______________________________              Cost/Insurance barriers addressed: YES/NO    _ _ _ _ _ _ _ _ _ _ _ _  RELEVANT LABS/IMAGING:    < from: TTE Echo Complete w/ Contrast w/ Doppler (04.10.24 @ 11:19) >    -----  CONCLUSIONS:     1. Normal left ventricular size and systolic function.   2. Mild symmetric left ventricular hypertrophy.   3. Normal right ventricular size and systolic function.   4. Normal atria.   5. Moderate aortic stenosis.   6. Mild tricuspid regurgitation.   7. No evidence of pulmonary hypertension.   8. No pericardial effusion.   9. No prior echo is available for comparison.    < end of copied text >    --------    EQUIPMENT:    [ x] OT  	OUTPATIENT TEAM AWARE: YES    _ _ _ _ _ _ _ _ _ _ _ _  Over 35 minutes was spent with the patient reviewing the discharge material including medications, follow up appointments, recovery, concerning symptoms, and how to contact their health care providers if they have questions.     Discussed on morning rounds with Dr. Fonseca.    Operation Date: 4/9/24 BAV/ PCI to mid LAD.     Primary Surgeon/Attending MD: Dr. Matias     Referring Physician: none  _ _ _ _ _ _ _ _ _ _ _ _  HOSPITAL COURSE:  88 y/o Vietnamese/English speaking F, former smoker, w/ a PMH of HTN, pAF on Eliquis, CAD (s/p LHC 1/8/24 w/ significant mLAD at [facility]), LF/LG severe AS, Breast CA (s/p mastectomy/chemo in [place] 2011), skin CA s/p resection, Gout, s/p R corneal transplant 2023 (only 20% vision in R eye), and recent hospitalization 1/8-1/13 at [facility] for syncope (found to have new AF, CAD and AS), initially presented to outpatient cardiologist for routine follow up after discharge. TTE 1/9/24: LVEF 55-60%, mild LVH, mild-mod MR, mod-severe AS (PV 2.95, MG 20.63), mild TR. R/LHC on 1/10/24 at [facility]: LM normal, pLAD 40%, mLAD 70-80% (calcified), LCx luminal, RCA luminal. She presented to [facility] for recommended staged PCI of mLAD w/ BAV, prior to future TAVR. On 4/9/2024 she underwent a BAV and PCI to mLAD with Dr. Matias. Intraop she had a transient bundle branch block. She arrived to the stepdown unit as a mini ICU with groin TVPP in place. POD1 TVP removed. TTE with moderate AS and no pericardial effusion. POD2 Patient feels well, has no complaints. Tolerating PO diet, satting well on room air, ambulating in halls. Will go home with OT. Per Dr. Fonseca she is medically stable for discharge home today, 4/11/24.     _ _ _ _ _ _ _ _ _ _ _ _  DISCHARGE PHYSICAL EXAM:  GEN: NAD, looks comfortable in bed. Speaking in complete sentences  Neuro: A&Ox3.  No focal deficits.  Moving all extremities.   CV: S1S2, regular, no murmurs appreciated.  No carotid bruits.  No JVD  Lungs: Clear B/L.  No wheezing, rales or rhonchi  ABD: Soft, non-tender, non-distended.  +Bowel sounds  EXT: Warm and well perfused. trace peripheral edema noted. All Distal pulses palpable bilaterally.   Musculoskeletal: Moving all extremities with normal ROM, no joint swelling  Bilateral groins c/d/i no ecchymosis or hematoma.      _ _ _ _ _ _ _ _ _     MEDICATION DISCHARGE CHECKLIST        BAV/PCI        [ ] Aspirin, [  ] Contraindicated, Reason_______________________________        [ x ] Plavix, [ ] Contraindicated, Reason _______________________________        Anticoagulation        [ x ] NOAC, [ ] Reason ___atrial fibrillation____________________________              Cost/Insurance barriers addressed: YES on preop    _ _ _ _ _ _ _ _ _ _ _ _  RELEVANT LABS/IMAGING:    < from: TTE Echo Complete w/ Contrast w/ Doppler (04.10.24 @ 11:19) >    -----  CONCLUSIONS:     1. Normal left ventricular size and systolic function.   2. Mild symmetric left ventricular hypertrophy.   3. Normal right ventricular size and systolic function.   4. Normal atria.   5. Moderate aortic stenosis.   6. Mild tricuspid regurgitation.   7. No evidence of pulmonary hypertension.   8. No pericardial effusion.   9. No prior echo is available for comparison.    < end of copied text >    < from: US Duplex Carotid Arteries Complete, Bilateral (04.10.24 @ 19:15) >    IMPRESSION:  1.  Hemodynamically significant stenosis of the left internal carotid   artery, greater than 70%.  2.  No significant hemodynamic stenosis of the right internal carotid   artery.  3.  Elevated velocities of the bilateral ECA, most likely stenosis.    < end of copied text >        --------    EQUIPMENT:    [ x] [facility]  	OUTPATIENT TEAM AWARE: YES    _ _ _ _ _ _ _ _ _ _ _ _  Over 35 minutes was spent with the patient reviewing the discharge material including medications, follow up appointments, recovery, concerning symptoms, and how to contact their health care providers if they have questions.

## 2024-04-10 NOTE — DISCHARGE NOTE PROVIDER - NSDCFUADDINST_GEN_ALL_CORE_FT
-Walk daily as tolerated and use your incentive spirometer 10 times every hour while you are awake.     -Please weigh yourself daily. If you notice over a 3 pound weight gain in 3 days, this is a sign you are likely retaining too much fluid. It is imperative you call our right away with unexplained rapid weight gain.      -Please continue to wear the compression stockings given to you in the hospital at home. This is a way to prevent fluid from building up in your legs.     -No driving or strenuous activity/exercise until cleared by your surgeon.    -Gently clean your incisions with unscented/antibacterial soap and water, pat dry.  You may leave them open to air.    -Call your doctor if you have shortness of breath, chest pain not relieved by pain medication, dizziness, fever >100.5, or increased redness or drainage from incisions.    You had a MCOT monitor (an external cardiac rhythm monitoring device) placed on your day of discharge.  This helps us monitor your heart while you are out of the hospital for 30 days after discharge. Should your heart go into an abnormal or dangerous rhythm you will receieve a call from the MCOT team and your Structural Heart team of Doctors and PA's will be notified.    1. Keep the monitor within 30 feet of you at all times.  2. When you feel any symptom (chest pain, dizziness, palpitations, weakness, fatigue or anything outside of your normal), press the “Record Symptoms” button on the main phone of your phone  3. Shower or exercise as normal whilewearing the MCOT Patch. Do not swim or take a bath. Patch is water-resistant, not waterproof  4. When the battery is low on the phone or on the device, use the supplied . The monitor will show a warning message when the battery is low.  5. Do not remove the patch from yourskin after you begin monitoring. With normal wear, each patch should last 5 days. To replace the patch follow instructions in the MCOT box with the Patch Guide  6. Any issues with the MCOT device or phone please call Customer Service at [phone].  7. If you have any other questions at all please call the Structural Heart office at [phone]   You had a MCOT monitor (an external cardiac rhythm monitoring device) placed on your day of discharge.  This helps us monitor your heart while you are out of the hospital for 30 days after discharge. Should your heart go into an abnormal or dangerous rhythm you will receieve a call from the MCOT team and your Structural Heart team of Doctors and PA's will be notified.    1. Keep the monitor within 30 feet of you at all times.  2. When you feel any symptom (chest pain, dizziness, palpitations, weakness, fatigue or anything outside of your normal), press the “Record Symptoms” button on the main phone of your phone  3. Shower or exercise as normal whilewearing the MCOT Patch. Do not swim or take a bath. Patch is water-resistant, not waterproof  4. When the battery is low on the phone or on the device, use the supplied . The monitor will show a warning message when the battery is low.  5. Do not remove the patch from yourskin after you begin monitoring. With normal wear, each patch should last 5 days. To replace the patch follow instructions in the MCOT box with the Patch Guide  6. Any issues with the MCOT device or phone please call Customer Service at [phone].  7. If you have any other questions at all please call the Structural Heart office at [phone]     -Walk daily as tolerated and use your incentive spirometer 10 times every hour while you are awake.     -Please weigh yourself daily. If you notice over a 3 pound weight gain in 3 days, this is a sign you are likely retaining too much fluid. It is imperative you call our right away with unexplained rapid weight gain.      -Please continue to wear the compression stockings given to you in the hospital at home. This is a way to prevent fluid from building up in your legs.     -No driving or strenuous activity/exercise until cleared by your surgeon.    -Gently clean your incisions with unscented/antibacterial soap and water, pat dry.  You may leave them open to air.    -Call your doctor if you have shortness of breath, chest pain not relieved by pain medication, dizziness, fever >100.5, or increased redness or drainage from incisions.    You had a MCOT monitor (an external cardiac rhythm monitoring device) placed on your day of discharge.  This helps us monitor your heart while you are out of the hospital for 30 days after discharge. Should your heart go into an abnormal or dangerous rhythm you will receieve a call from the MCOT team and your Structural Heart team of Doctors and PA's will be notified.    1. Keep the monitor within 30 feet of you at all times.  2. When you feel any symptom (chest pain, dizziness, palpitations, weakness, fatigue or anything outside of your normal), press the “Record Symptoms” button on the main phone of your phone  3. Shower or exercise as normal whilewearing the MCOT Patch. Do not swim or take a bath. Patch is water-resistant, not waterproof  4. When the battery is low on the phone or on the device, use the supplied . The monitor will show a warning message when the battery is low.  5. Do not remove the patch from yourskin after you begin monitoring. With normal wear, each patch should last 5 days. To replace the patch follow instructions in the MCOT box with the Patch Guide  6. Any issues with the MCOT device or phone please call [facility] Service at [phone].  7. If you have any other questions at all please call the Structural Heart office at [phone]   You had a MCOT monitor (an external cardiac rhythm monitoring device) placed on your day of discharge.  This helps us monitor your heart while you are out of the hospital for 30 days after discharge. Should your heart go into an abnormal or dangerous rhythm you will receieve a call from the MCOT team and your Structural Heart team of Doctors and PA's will be notified.    1. Keep the monitor within 30 feet of you at all times.  2. When you feel any symptom (chest pain, dizziness, palpitations, weakness, fatigue or anything outside of your normal), press the “Record Symptoms” button on the main phone of your phone  3. Shower or exercise as normal whilewearing the MCOT Patch. Do not swim or take a bath. Patch is water-resistant, not waterproof  4. When the battery is low on the phone or on the device, use the supplied . The monitor will show a warning message when the battery is low.  5. Do not remove the patch from yourskin after you begin monitoring. With normal wear, each patch should last 5 days. To replace the patch follow instructions in the MCOT box with the Patch Guide  6. Any issues with the MCOT device or phone please call Customer Service at [phone].  7. If you have any other questions at all please call the Structural Heart office at [phone]     -Walk daily as tolerated and use your incentive spirometer 10 times every hour while you are awake.     -Please continue to wear the compression stockings given to you in the hospital at home. This is a way to prevent fluid from building up in your legs.     -No driving or strenuous activity/exercise until cleared by your surgeon.    -Gently clean your incisions with unscented/antibacterial soap and water, pat dry.  You may leave them open to air.    -Call your doctor if you have shortness of breath, chest pain not relieved by pain medication, dizziness, fever >100.5, or increased redness or drainage from incisions.

## 2024-04-10 NOTE — DISCHARGE NOTE PROVIDER - NSDCFUADDAPPT_GEN_ALL_CORE_FT
-Our office will call you with your follow up appointments. If you do not hear from them by Monday, call them at [phone].

## 2024-04-11 ENCOUNTER — TRANSCRIPTION ENCOUNTER (OUTPATIENT)
Age: 89
End: 2024-04-11

## 2024-04-11 VITALS
RESPIRATION RATE: 18 BRPM | OXYGEN SATURATION: 94 % | HEART RATE: 88 BPM | DIASTOLIC BLOOD PRESSURE: 58 MMHG | SYSTOLIC BLOOD PRESSURE: 115 MMHG

## 2024-04-11 LAB
ALBUMIN SERPL ELPH-MCNC: 3.9 G/DL — SIGNIFICANT CHANGE UP (ref 3.3–5)
ALP SERPL-CCNC: 52 U/L — SIGNIFICANT CHANGE UP (ref 40–120)
ALT FLD-CCNC: 6 U/L — LOW (ref 10–45)
ANION GAP SERPL CALC-SCNC: 10 MMOL/L — SIGNIFICANT CHANGE UP (ref 5–17)
APTT BLD: 35.9 SEC — HIGH (ref 24.5–35.6)
AST SERPL-CCNC: 14 U/L — SIGNIFICANT CHANGE UP (ref 10–40)
BILIRUB SERPL-MCNC: 0.5 MG/DL — SIGNIFICANT CHANGE UP (ref 0.2–1.2)
BUN SERPL-MCNC: 11 MG/DL — SIGNIFICANT CHANGE UP (ref 7–23)
CALCIUM SERPL-MCNC: 9 MG/DL — SIGNIFICANT CHANGE UP (ref 8.4–10.5)
CHLORIDE SERPL-SCNC: 103 MMOL/L — SIGNIFICANT CHANGE UP (ref 96–108)
CO2 SERPL-SCNC: 27 MMOL/L — SIGNIFICANT CHANGE UP (ref 22–31)
CREAT SERPL-MCNC: 0.66 MG/DL — SIGNIFICANT CHANGE UP (ref 0.5–1.3)
EGFR: 84 ML/MIN/1.73M2 — SIGNIFICANT CHANGE UP
GLUCOSE SERPL-MCNC: 106 MG/DL — HIGH (ref 70–99)
HCT VFR BLD CALC: 36 % — SIGNIFICANT CHANGE UP (ref 34.5–45)
HGB BLD-MCNC: 11.8 G/DL — SIGNIFICANT CHANGE UP (ref 11.5–15.5)
INR BLD: 1.32 — HIGH (ref 0.85–1.18)
MAGNESIUM SERPL-MCNC: 1.9 MG/DL — SIGNIFICANT CHANGE UP (ref 1.6–2.6)
MCHC RBC-ENTMCNC: 30.9 PG — SIGNIFICANT CHANGE UP (ref 27–34)
MCHC RBC-ENTMCNC: 32.8 GM/DL — SIGNIFICANT CHANGE UP (ref 32–36)
MCV RBC AUTO: 94.2 FL — SIGNIFICANT CHANGE UP (ref 80–100)
NRBC # BLD: 0 /100 WBCS — SIGNIFICANT CHANGE UP (ref 0–0)
PHOSPHATE SERPL-MCNC: 2.8 MG/DL — SIGNIFICANT CHANGE UP (ref 2.5–4.5)
PLATELET # BLD AUTO: 171 K/UL — SIGNIFICANT CHANGE UP (ref 150–400)
POTASSIUM SERPL-MCNC: 4 MMOL/L — SIGNIFICANT CHANGE UP (ref 3.5–5.3)
POTASSIUM SERPL-SCNC: 4 MMOL/L — SIGNIFICANT CHANGE UP (ref 3.5–5.3)
PROT SERPL-MCNC: 6.1 G/DL — SIGNIFICANT CHANGE UP (ref 6–8.3)
PROTHROM AB SERPL-ACNC: 14.9 SEC — HIGH (ref 9.5–13)
RBC # BLD: 3.82 M/UL — SIGNIFICANT CHANGE UP (ref 3.8–5.2)
RBC # FLD: 13.8 % — SIGNIFICANT CHANGE UP (ref 10.3–14.5)
SODIUM SERPL-SCNC: 140 MMOL/L — SIGNIFICANT CHANGE UP (ref 135–145)
WBC # BLD: 6.56 K/UL — SIGNIFICANT CHANGE UP (ref 3.8–10.5)
WBC # FLD AUTO: 6.56 K/UL — SIGNIFICANT CHANGE UP (ref 3.8–10.5)

## 2024-04-11 PROCEDURE — 80048 BASIC METABOLIC PNL TOTAL CA: CPT

## 2024-04-11 PROCEDURE — 82565 ASSAY OF CREATININE: CPT

## 2024-04-11 PROCEDURE — 36415 COLL VENOUS BLD VENIPUNCTURE: CPT

## 2024-04-11 PROCEDURE — 85610 PROTHROMBIN TIME: CPT

## 2024-04-11 PROCEDURE — 93005 ELECTROCARDIOGRAM TRACING: CPT

## 2024-04-11 PROCEDURE — P9045: CPT

## 2024-04-11 PROCEDURE — 80053 COMPREHEN METABOLIC PANEL: CPT

## 2024-04-11 PROCEDURE — 85670 THROMBIN TIME PLASMA: CPT

## 2024-04-11 PROCEDURE — 93880 EXTRACRANIAL BILAT STUDY: CPT

## 2024-04-11 PROCEDURE — C1769: CPT

## 2024-04-11 PROCEDURE — 85730 THROMBOPLASTIN TIME PARTIAL: CPT

## 2024-04-11 PROCEDURE — C1725: CPT

## 2024-04-11 PROCEDURE — 71045 X-RAY EXAM CHEST 1 VIEW: CPT | Mod: 26

## 2024-04-11 PROCEDURE — 80061 LIPID PANEL: CPT

## 2024-04-11 PROCEDURE — 85025 COMPLETE CBC W/AUTO DIFF WBC: CPT

## 2024-04-11 PROCEDURE — C8929: CPT

## 2024-04-11 PROCEDURE — C1889: CPT

## 2024-04-11 PROCEDURE — C1887: CPT

## 2024-04-11 PROCEDURE — C1760: CPT

## 2024-04-11 PROCEDURE — C1874: CPT

## 2024-04-11 PROCEDURE — 82553 CREATINE MB FRACTION: CPT

## 2024-04-11 PROCEDURE — 84100 ASSAY OF PHOSPHORUS: CPT

## 2024-04-11 PROCEDURE — 83735 ASSAY OF MAGNESIUM: CPT

## 2024-04-11 PROCEDURE — 82550 ASSAY OF CK (CPK): CPT

## 2024-04-11 PROCEDURE — 85027 COMPLETE CBC AUTOMATED: CPT

## 2024-04-11 PROCEDURE — 83036 HEMOGLOBIN GLYCOSYLATED A1C: CPT

## 2024-04-11 PROCEDURE — 71045 X-RAY EXAM CHEST 1 VIEW: CPT

## 2024-04-11 PROCEDURE — C1894: CPT

## 2024-04-11 RX ORDER — CLOPIDOGREL BISULFATE 75 MG/1
1 TABLET, FILM COATED ORAL
Qty: 30 | Refills: 0
Start: 2024-04-11 | End: 2024-05-10

## 2024-04-11 RX ORDER — METOPROLOL TARTRATE 50 MG
0.5 TABLET ORAL
Refills: 0 | DISCHARGE

## 2024-04-11 RX ORDER — LANOLIN ALCOHOL/MO/W.PET/CERES
5 CREAM (GRAM) TOPICAL AT BEDTIME
Refills: 0 | Status: DISCONTINUED | OUTPATIENT
Start: 2024-04-11 | End: 2024-04-11

## 2024-04-11 RX ORDER — ACETAMINOPHEN 500 MG
2 TABLET ORAL
Qty: 0 | Refills: 0 | DISCHARGE
Start: 2024-04-11

## 2024-04-11 RX ORDER — PANTOPRAZOLE SODIUM 20 MG/1
1 TABLET, DELAYED RELEASE ORAL
Qty: 30 | Refills: 0
Start: 2024-04-11 | End: 2024-05-10

## 2024-04-11 RX ADMIN — APIXABAN 2.5 MILLIGRAM(S): 2.5 TABLET, FILM COATED ORAL at 05:25

## 2024-04-11 RX ADMIN — Medication 5 MILLIGRAM(S): at 04:26

## 2024-04-11 RX ADMIN — PANTOPRAZOLE SODIUM 40 MILLIGRAM(S): 20 TABLET, DELAYED RELEASE ORAL at 06:43

## 2024-04-11 RX ADMIN — CLOPIDOGREL BISULFATE 75 MILLIGRAM(S): 75 TABLET, FILM COATED ORAL at 11:33

## 2024-04-11 RX ADMIN — Medication 1 DROP(S): at 11:33

## 2024-04-11 NOTE — DISCHARGE NOTE NURSING/CASE MANAGEMENT/SOCIAL WORK - PATIENT PORTAL LINK FT
You can access the FollowMyHealth Patient Portal offered by [facility] by registering at the following website: [email or web address]. By joining [facility]’s FollowMyHealth portal, you will also be able to view your health information using other applications (apps) compatible with our system.

## 2024-04-11 NOTE — DISCHARGE NOTE NURSING/CASE MANAGEMENT/SOCIAL WORK - NSDCFUADDAPPT_GEN_ALL_CORE_FT
-Our office will call you with your follow up appointments. If you do not hear from them by Monday, call them at [phone].

## 2024-04-12 DIAGNOSIS — Z95.5 ATHEROSCLEROTIC HEART DISEASE OF NATIVE CORONARY ARTERY W/OUT ANGINA PECTORIS: ICD-10-CM

## 2024-04-12 DIAGNOSIS — I25.10 ATHEROSCLEROTIC HEART DISEASE OF NATIVE CORONARY ARTERY W/OUT ANGINA PECTORIS: ICD-10-CM

## 2024-04-12 RX ORDER — CLOPIDOGREL 75 MG/1
75 TABLET, FILM COATED ORAL DAILY
Refills: 0 | Status: ACTIVE | COMMUNITY

## 2024-04-12 RX ORDER — PANTOPRAZOLE 40 MG/1
40 TABLET, DELAYED RELEASE ORAL DAILY
Qty: 1 | Refills: 3 | Status: ACTIVE | COMMUNITY

## 2024-04-17 DIAGNOSIS — I48.0 PAROXYSMAL ATRIAL FIBRILLATION: ICD-10-CM

## 2024-04-17 DIAGNOSIS — Z79.01 LONG TERM (CURRENT) USE OF ANTICOAGULANTS: ICD-10-CM

## 2024-04-17 DIAGNOSIS — I10 ESSENTIAL (PRIMARY) HYPERTENSION: ICD-10-CM

## 2024-04-17 DIAGNOSIS — Z85.828 PERSONAL HISTORY OF OTHER MALIGNANT NEOPLASM OF SKIN: ICD-10-CM

## 2024-04-17 DIAGNOSIS — M10.9 GOUT, UNSPECIFIED: ICD-10-CM

## 2024-04-17 DIAGNOSIS — I25.10 ATHEROSCLEROTIC HEART DISEASE OF NATIVE CORONARY ARTERY WITHOUT ANGINA PECTORIS: ICD-10-CM

## 2024-04-17 DIAGNOSIS — I08.3 COMBINED RHEUMATIC DISORDERS OF MITRAL, AORTIC AND TRICUSPID VALVES: ICD-10-CM

## 2024-04-17 DIAGNOSIS — I45.4 NONSPECIFIC INTRAVENTRICULAR BLOCK: ICD-10-CM

## 2024-04-17 DIAGNOSIS — Z85.3 PERSONAL HISTORY OF MALIGNANT NEOPLASM OF BREAST: ICD-10-CM

## 2024-04-17 DIAGNOSIS — Z87.891 PERSONAL HISTORY OF NICOTINE DEPENDENCE: ICD-10-CM

## 2024-04-17 DIAGNOSIS — Z92.21 PERSONAL HISTORY OF ANTINEOPLASTIC CHEMOTHERAPY: ICD-10-CM

## 2024-04-17 DIAGNOSIS — Z90.13 ACQUIRED ABSENCE OF BILATERAL BREASTS AND NIPPLES: ICD-10-CM

## 2024-04-25 PROBLEM — M10.9 GOUT, UNSPECIFIED: Chronic | Status: ACTIVE | Noted: 2024-04-05

## 2024-04-25 PROBLEM — I48.91 UNSPECIFIED ATRIAL FIBRILLATION: Chronic | Status: ACTIVE | Noted: 2024-04-05

## 2024-04-26 PROBLEM — I10 ESSENTIAL (PRIMARY) HYPERTENSION: Chronic | Status: ACTIVE | Noted: 2024-04-05

## 2024-04-26 PROBLEM — C50.919 MALIGNANT NEOPLASM OF UNSPECIFIED SITE OF UNSPECIFIED FEMALE BREAST: Chronic | Status: ACTIVE | Noted: 2024-04-05

## 2024-05-10 ENCOUNTER — NON-APPOINTMENT (OUTPATIENT)
Age: 89
End: 2024-05-10

## 2024-05-15 PROBLEM — I25.10 ATHEROSCLEROTIC HEART DISEASE OF NATIVE CORONARY ARTERY WITHOUT ANGINA PECTORIS: Chronic | Status: ACTIVE | Noted: 2024-04-05

## 2024-05-15 PROBLEM — I35.0 NONRHEUMATIC AORTIC (VALVE) STENOSIS: Chronic | Status: ACTIVE | Noted: 2024-04-05

## 2024-05-21 ENCOUNTER — NON-APPOINTMENT (OUTPATIENT)
Age: 89
End: 2024-05-21

## 2024-05-22 ENCOUNTER — TRANSCRIPTION ENCOUNTER (OUTPATIENT)
Age: 89
End: 2024-05-22

## 2024-05-22 ENCOUNTER — NON-APPOINTMENT (OUTPATIENT)
Age: 89
End: 2024-05-22

## 2024-05-22 VITALS
SYSTOLIC BLOOD PRESSURE: 171 MMHG | TEMPERATURE: 98 F | DIASTOLIC BLOOD PRESSURE: 85 MMHG | RESPIRATION RATE: 16 BRPM | HEART RATE: 76 BPM | OXYGEN SATURATION: 97 % | HEIGHT: 63 IN | WEIGHT: 134.92 LBS

## 2024-05-22 NOTE — PATIENT PROFILE ADULT - FALL HARM RISK - HARM RISK INTERVENTIONS

## 2024-05-23 ENCOUNTER — INPATIENT (INPATIENT)
Facility: HOSPITAL | Age: 89
LOS: 1 days | Discharge: HOME | DRG: 267 | End: 2024-05-25
Attending: STUDENT IN AN ORGANIZED HEALTH CARE EDUCATION/TRAINING PROGRAM | Admitting: STUDENT IN AN ORGANIZED HEALTH CARE EDUCATION/TRAINING PROGRAM
Payer: MEDICARE

## 2024-05-23 ENCOUNTER — APPOINTMENT (OUTPATIENT)
Dept: CARDIOTHORACIC SURGERY | Facility: HOSPITAL | Age: 89
End: 2024-05-23

## 2024-05-23 DIAGNOSIS — Z90.13 ACQUIRED ABSENCE OF BILATERAL BREASTS AND NIPPLES: Chronic | ICD-10-CM

## 2024-05-23 DIAGNOSIS — Z94.7 CORNEAL TRANSPLANT STATUS: Chronic | ICD-10-CM

## 2024-05-23 DIAGNOSIS — Z90.49 ACQUIRED ABSENCE OF OTHER SPECIFIED PARTS OF DIGESTIVE TRACT: Chronic | ICD-10-CM

## 2024-05-23 DIAGNOSIS — Z98.890 OTHER SPECIFIED POSTPROCEDURAL STATES: Chronic | ICD-10-CM

## 2024-05-23 LAB
ALBUMIN SERPL ELPH-MCNC: 3.5 G/DL — SIGNIFICANT CHANGE UP (ref 3.3–5)
ALP SERPL-CCNC: 54 U/L — SIGNIFICANT CHANGE UP (ref 40–120)
ALT FLD-CCNC: 9 U/L — LOW (ref 10–45)
ANION GAP SERPL CALC-SCNC: 7 MMOL/L — SIGNIFICANT CHANGE UP (ref 5–17)
ANION GAP SERPL CALC-SCNC: 7 MMOL/L — SIGNIFICANT CHANGE UP (ref 5–17)
APTT BLD: 32.6 SEC — SIGNIFICANT CHANGE UP (ref 24.5–35.6)
APTT BLD: 41.2 SEC — HIGH (ref 24.5–35.6)
AST SERPL-CCNC: 16 U/L — SIGNIFICANT CHANGE UP (ref 10–40)
BASE EXCESS BLDA CALC-SCNC: -2.7 MMOL/L — LOW (ref -2–3)
BASE EXCESS BLDA CALC-SCNC: 0 MMOL/L — SIGNIFICANT CHANGE UP (ref -2–3)
BASE EXCESS BLDA CALC-SCNC: 0.5 MMOL/L — SIGNIFICANT CHANGE UP (ref -2–3)
BASE EXCESS BLDA CALC-SCNC: 3.3 MMOL/L — HIGH (ref -2–3)
BASOPHILS # BLD AUTO: 0.04 K/UL — SIGNIFICANT CHANGE UP (ref 0–0.2)
BASOPHILS NFR BLD AUTO: 0.7 % — SIGNIFICANT CHANGE UP (ref 0–2)
BILIRUB SERPL-MCNC: 0.4 MG/DL — SIGNIFICANT CHANGE UP (ref 0.2–1.2)
BLD GP AB SCN SERPL QL: NEGATIVE — SIGNIFICANT CHANGE UP
BUN SERPL-MCNC: 11 MG/DL — SIGNIFICANT CHANGE UP (ref 7–23)
BUN SERPL-MCNC: 13 MG/DL — SIGNIFICANT CHANGE UP (ref 7–23)
CA-I BLDA-SCNC: 1.06 MMOL/L — LOW (ref 1.15–1.33)
CA-I BLDA-SCNC: 1.2 MMOL/L — SIGNIFICANT CHANGE UP (ref 1.15–1.33)
CA-I BLDA-SCNC: 1.23 MMOL/L — SIGNIFICANT CHANGE UP (ref 1.15–1.33)
CA-I BLDA-SCNC: 1.24 MMOL/L — SIGNIFICANT CHANGE UP (ref 1.15–1.33)
CALCIUM SERPL-MCNC: 9 MG/DL — SIGNIFICANT CHANGE UP (ref 8.4–10.5)
CALCIUM SERPL-MCNC: 9.3 MG/DL — SIGNIFICANT CHANGE UP (ref 8.4–10.5)
CHLORIDE SERPL-SCNC: 105 MMOL/L — SIGNIFICANT CHANGE UP (ref 96–108)
CHLORIDE SERPL-SCNC: 107 MMOL/L — SIGNIFICANT CHANGE UP (ref 96–108)
CO2 BLDA-SCNC: 23 MMOL/L — SIGNIFICANT CHANGE UP (ref 19–24)
CO2 BLDA-SCNC: 30 MMOL/L — HIGH (ref 19–24)
CO2 BLDA-SCNC: 31 MMOL/L — HIGH (ref 19–24)
CO2 BLDA-SCNC: 32 MMOL/L — HIGH (ref 19–24)
CO2 SERPL-SCNC: 27 MMOL/L — SIGNIFICANT CHANGE UP (ref 22–31)
CO2 SERPL-SCNC: 29 MMOL/L — SIGNIFICANT CHANGE UP (ref 22–31)
COHGB MFR BLDA: 0.9 % — SIGNIFICANT CHANGE UP
COHGB MFR BLDA: 1 % — SIGNIFICANT CHANGE UP
COHGB MFR BLDA: 1.1 % — SIGNIFICANT CHANGE UP
COHGB MFR BLDA: 1.3 % — SIGNIFICANT CHANGE UP
CREAT SERPL-MCNC: 0.61 MG/DL — SIGNIFICANT CHANGE UP (ref 0.5–1.3)
CREAT SERPL-MCNC: 0.78 MG/DL — SIGNIFICANT CHANGE UP (ref 0.5–1.3)
EGFR: 73 ML/MIN/1.73M2 — SIGNIFICANT CHANGE UP
EGFR: 85 ML/MIN/1.73M2 — SIGNIFICANT CHANGE UP
EOSINOPHIL # BLD AUTO: 0.26 K/UL — SIGNIFICANT CHANGE UP (ref 0–0.5)
EOSINOPHIL NFR BLD AUTO: 4.4 % — SIGNIFICANT CHANGE UP (ref 0–6)
GAS PNL BLDA: SIGNIFICANT CHANGE UP
GLUCOSE BLDA-MCNC: 197 MG/DL — HIGH (ref 70–99)
GLUCOSE BLDA-MCNC: 86 MG/DL — SIGNIFICANT CHANGE UP (ref 70–99)
GLUCOSE BLDA-MCNC: 92 MG/DL — SIGNIFICANT CHANGE UP (ref 70–99)
GLUCOSE BLDA-MCNC: 99 MG/DL — SIGNIFICANT CHANGE UP (ref 70–99)
GLUCOSE SERPL-MCNC: 103 MG/DL — HIGH (ref 70–99)
GLUCOSE SERPL-MCNC: 94 MG/DL — SIGNIFICANT CHANGE UP (ref 70–99)
HCO3 BLDA-SCNC: 22 MMOL/L — SIGNIFICANT CHANGE UP (ref 21–28)
HCO3 BLDA-SCNC: 28 MMOL/L — SIGNIFICANT CHANGE UP (ref 21–28)
HCO3 BLDA-SCNC: 30 MMOL/L — HIGH (ref 21–28)
HCO3 BLDA-SCNC: 30 MMOL/L — HIGH (ref 21–28)
HCT VFR BLD CALC: 37.7 % — SIGNIFICANT CHANGE UP (ref 34.5–45)
HCT VFR BLD CALC: 40.5 % — SIGNIFICANT CHANGE UP (ref 34.5–45)
HGB BLD-MCNC: 12.6 G/DL — SIGNIFICANT CHANGE UP (ref 11.5–15.5)
HGB BLD-MCNC: 13.5 G/DL — SIGNIFICANT CHANGE UP (ref 11.5–15.5)
HGB BLDA-MCNC: 11.8 G/DL — SIGNIFICANT CHANGE UP (ref 11.7–16.1)
HGB BLDA-MCNC: 12 G/DL — SIGNIFICANT CHANGE UP (ref 11.7–16.1)
HGB BLDA-MCNC: 12.1 G/DL — SIGNIFICANT CHANGE UP (ref 11.7–16.1)
HGB BLDA-MCNC: 12.4 G/DL — SIGNIFICANT CHANGE UP (ref 11.7–16.1)
IMM GRANULOCYTES NFR BLD AUTO: 0.7 % — SIGNIFICANT CHANGE UP (ref 0–0.9)
INR BLD: 1.07 — SIGNIFICANT CHANGE UP (ref 0.85–1.18)
INR BLD: 1.13 — SIGNIFICANT CHANGE UP (ref 0.85–1.18)
LYMPHOCYTES # BLD AUTO: 1.67 K/UL — SIGNIFICANT CHANGE UP (ref 1–3.3)
LYMPHOCYTES # BLD AUTO: 28.4 % — SIGNIFICANT CHANGE UP (ref 13–44)
MAGNESIUM SERPL-MCNC: 1.8 MG/DL — SIGNIFICANT CHANGE UP (ref 1.6–2.6)
MAGNESIUM SERPL-MCNC: 1.9 MG/DL — SIGNIFICANT CHANGE UP (ref 1.6–2.6)
MCHC RBC-ENTMCNC: 31.2 PG — SIGNIFICANT CHANGE UP (ref 27–34)
MCHC RBC-ENTMCNC: 31.5 PG — SIGNIFICANT CHANGE UP (ref 27–34)
MCHC RBC-ENTMCNC: 33.3 GM/DL — SIGNIFICANT CHANGE UP (ref 32–36)
MCHC RBC-ENTMCNC: 33.4 GM/DL — SIGNIFICANT CHANGE UP (ref 32–36)
MCV RBC AUTO: 93.5 FL — SIGNIFICANT CHANGE UP (ref 80–100)
MCV RBC AUTO: 94.3 FL — SIGNIFICANT CHANGE UP (ref 80–100)
METHGB MFR BLDA: 0.5 % — SIGNIFICANT CHANGE UP
METHGB MFR BLDA: 0.7 % — SIGNIFICANT CHANGE UP
METHGB MFR BLDA: 0.9 % — SIGNIFICANT CHANGE UP
METHGB MFR BLDA: 0.9 % — SIGNIFICANT CHANGE UP
MONOCYTES # BLD AUTO: 0.43 K/UL — SIGNIFICANT CHANGE UP (ref 0–0.9)
MONOCYTES NFR BLD AUTO: 7.3 % — SIGNIFICANT CHANGE UP (ref 2–14)
NEUTROPHILS # BLD AUTO: 3.43 K/UL — SIGNIFICANT CHANGE UP (ref 1.8–7.4)
NEUTROPHILS NFR BLD AUTO: 58.5 % — SIGNIFICANT CHANGE UP (ref 43–77)
NRBC # BLD: 0 /100 WBCS — SIGNIFICANT CHANGE UP (ref 0–0)
NRBC # BLD: 0 /100 WBCS — SIGNIFICANT CHANGE UP (ref 0–0)
NT-PROBNP SERPL-SCNC: 543 PG/ML — HIGH (ref 0–300)
OXYHGB MFR BLDA: 93.4 % — SIGNIFICANT CHANGE UP (ref 90–95)
OXYHGB MFR BLDA: 97 % — HIGH (ref 90–95)
OXYHGB MFR BLDA: 97 % — HIGH (ref 90–95)
OXYHGB MFR BLDA: 97.8 % — HIGH (ref 90–95)
PCO2 BLDA: 36 MMHG — SIGNIFICANT CHANGE UP (ref 32–45)
PCO2 BLDA: 51 MMHG — HIGH (ref 32–45)
PCO2 BLDA: 59 MMHG — HIGH (ref 32–45)
PCO2 BLDA: 71 MMHG — CRITICAL HIGH (ref 32–45)
PH BLDA: 7.23 — LOW (ref 7.35–7.45)
PH BLDA: 7.28 — LOW (ref 7.35–7.45)
PH BLDA: 7.37 — SIGNIFICANT CHANGE UP (ref 7.35–7.45)
PH BLDA: 7.39 — SIGNIFICANT CHANGE UP (ref 7.35–7.45)
PHOSPHATE SERPL-MCNC: 4.1 MG/DL — SIGNIFICANT CHANGE UP (ref 2.5–4.5)
PLATELET # BLD AUTO: 152 K/UL — SIGNIFICANT CHANGE UP (ref 150–400)
PLATELET # BLD AUTO: 192 K/UL — SIGNIFICANT CHANGE UP (ref 150–400)
PO2 BLDA: 122 MMHG — HIGH (ref 83–108)
PO2 BLDA: 163 MMHG — HIGH (ref 83–108)
PO2 BLDA: 341 MMHG — HIGH (ref 83–108)
PO2 BLDA: 73 MMHG — LOW (ref 83–108)
POTASSIUM BLDA-SCNC: 4.1 MMOL/L — SIGNIFICANT CHANGE UP (ref 3.5–5.1)
POTASSIUM BLDA-SCNC: 4.1 MMOL/L — SIGNIFICANT CHANGE UP (ref 3.5–5.1)
POTASSIUM BLDA-SCNC: 4.2 MMOL/L — SIGNIFICANT CHANGE UP (ref 3.5–5.1)
POTASSIUM BLDA-SCNC: 4.5 MMOL/L — SIGNIFICANT CHANGE UP (ref 3.5–5.1)
POTASSIUM SERPL-MCNC: 4.2 MMOL/L — SIGNIFICANT CHANGE UP (ref 3.5–5.3)
POTASSIUM SERPL-MCNC: 4.5 MMOL/L — SIGNIFICANT CHANGE UP (ref 3.5–5.3)
POTASSIUM SERPL-SCNC: 4.2 MMOL/L — SIGNIFICANT CHANGE UP (ref 3.5–5.3)
POTASSIUM SERPL-SCNC: 4.5 MMOL/L — SIGNIFICANT CHANGE UP (ref 3.5–5.3)
PROT SERPL-MCNC: 5.8 G/DL — LOW (ref 6–8.3)
PROTHROM AB SERPL-ACNC: 12.2 SEC — SIGNIFICANT CHANGE UP (ref 9.5–13)
PROTHROM AB SERPL-ACNC: 12.8 SEC — SIGNIFICANT CHANGE UP (ref 9.5–13)
RBC # BLD: 4 M/UL — SIGNIFICANT CHANGE UP (ref 3.8–5.2)
RBC # BLD: 4.33 M/UL — SIGNIFICANT CHANGE UP (ref 3.8–5.2)
RBC # FLD: 13.6 % — SIGNIFICANT CHANGE UP (ref 10.3–14.5)
RBC # FLD: 13.8 % — SIGNIFICANT CHANGE UP (ref 10.3–14.5)
RH IG SCN BLD-IMP: POSITIVE — SIGNIFICANT CHANGE UP
SAO2 % BLDA: 95.2 % — SIGNIFICANT CHANGE UP (ref 94–98)
SAO2 % BLDA: 98.6 % — HIGH (ref 94–98)
SAO2 % BLDA: 98.8 % — HIGH (ref 94–98)
SAO2 % BLDA: 99.8 % — HIGH (ref 94–98)
SODIUM BLDA-SCNC: 131 MMOL/L — LOW (ref 136–145)
SODIUM BLDA-SCNC: 138 MMOL/L — SIGNIFICANT CHANGE UP (ref 136–145)
SODIUM SERPL-SCNC: 141 MMOL/L — SIGNIFICANT CHANGE UP (ref 135–145)
SODIUM SERPL-SCNC: 141 MMOL/L — SIGNIFICANT CHANGE UP (ref 135–145)
WBC # BLD: 5.11 K/UL — SIGNIFICANT CHANGE UP (ref 3.8–10.5)
WBC # BLD: 5.87 K/UL — SIGNIFICANT CHANGE UP (ref 3.8–10.5)
WBC # FLD AUTO: 5.11 K/UL — SIGNIFICANT CHANGE UP (ref 3.8–10.5)
WBC # FLD AUTO: 5.87 K/UL — SIGNIFICANT CHANGE UP (ref 3.8–10.5)

## 2024-05-23 PROCEDURE — 33361 REPLACE AORTIC VALVE PERQ: CPT | Mod: 62,Q0

## 2024-05-23 PROCEDURE — 93010 ELECTROCARDIOGRAM REPORT: CPT | Mod: 1L

## 2024-05-23 PROCEDURE — 71045 X-RAY EXAM CHEST 1 VIEW: CPT | Mod: 26

## 2024-05-23 PROCEDURE — 33361 REPLACE AORTIC VALVE PERQ: CPT | Mod: 62,Q0,58

## 2024-05-23 DEVICE — SHEATH INTRODUCER TERUMO PINNACLE CORONARY 8FR X 10CM X 0.038" MINI WIRE: Type: IMPLANTABLE DEVICE | Status: FUNCTIONAL

## 2024-05-23 DEVICE — SUT PERCLOSE PROGLIDE 6FR: Type: IMPLANTABLE DEVICE | Status: FUNCTIONAL

## 2024-05-23 DEVICE — CATH DX PIG 145 INFIN 5FRX110CM: Type: IMPLANTABLE DEVICE | Status: FUNCTIONAL

## 2024-05-23 DEVICE — ANGIOSEAL VASC CLOS VIP 8FR: Type: IMPLANTABLE DEVICE | Status: FUNCTIONAL

## 2024-05-23 DEVICE — GWIRE JTIP 1.5MM .035X180CM: Type: IMPLANTABLE DEVICE | Status: FUNCTIONAL

## 2024-05-23 DEVICE — CATH DX AL1 INFIN 5FRX100CM: Type: IMPLANTABLE DEVICE | Status: FUNCTIONAL

## 2024-05-23 DEVICE — INTRO MICROPUNC 4FRX10CM SS: Type: IMPLANTABLE DEVICE | Status: FUNCTIONAL

## 2024-05-23 DEVICE — GUIDEWIRE STANDARD STRAIGHT .035" X 180CM: Type: IMPLANTABLE DEVICE | Status: FUNCTIONAL

## 2024-05-23 DEVICE — WIRE GD CONFIDA BRECKER CRV .035X260: Type: IMPLANTABLE DEVICE | Status: FUNCTIONAL

## 2024-05-23 DEVICE — PACING CATH PACEL RIGHT HEART CURVE 5FR: Type: IMPLANTABLE DEVICE | Status: FUNCTIONAL

## 2024-05-23 DEVICE — EMERALD GUIDEWIRE 0.35: Type: IMPLANTABLE DEVICE | Status: FUNCTIONAL

## 2024-05-23 DEVICE — GWIRE GUID  0.035INX150CM: Type: IMPLANTABLE DEVICE | Status: FUNCTIONAL

## 2024-05-23 DEVICE — VLV TRANS CATH W/COMM SYS SAPIEN 3 ULTRA 23MM: Type: IMPLANTABLE DEVICE | Status: FUNCTIONAL

## 2024-05-23 DEVICE — INTRODUCER SHEATH KIT GLIDESHEATH SLENDER FLEX STRAIGHT 21G 6F X 10CM: Type: IMPLANTABLE DEVICE | Status: FUNCTIONAL

## 2024-05-23 RX ORDER — PANTOPRAZOLE SODIUM 20 MG/1
40 TABLET, DELAYED RELEASE ORAL DAILY
Refills: 0 | Status: DISCONTINUED | OUTPATIENT
Start: 2024-05-23 | End: 2024-05-25

## 2024-05-23 RX ORDER — PREDNISOLONE SODIUM PHOSPHATE 1 %
1 DROPS OPHTHALMIC (EYE)
Refills: 0 | DISCHARGE

## 2024-05-23 RX ORDER — PREDNISOLONE SODIUM PHOSPHATE 1 %
1 DROPS OPHTHALMIC (EYE) EVERY 12 HOURS
Refills: 0 | Status: DISCONTINUED | OUTPATIENT
Start: 2024-05-23 | End: 2024-05-25

## 2024-05-23 RX ORDER — ASCORBIC ACID 60 MG
500 TABLET,CHEWABLE ORAL
Refills: 0 | Status: DISCONTINUED | OUTPATIENT
Start: 2024-05-23 | End: 2024-05-23

## 2024-05-23 RX ORDER — ACETAMINOPHEN 500 MG
650 TABLET ORAL EVERY 6 HOURS
Refills: 0 | Status: DISCONTINUED | OUTPATIENT
Start: 2024-05-23 | End: 2024-05-25

## 2024-05-23 RX ORDER — HEPARIN SODIUM 5000 [USP'U]/ML
5000 INJECTION INTRAVENOUS; SUBCUTANEOUS EVERY 8 HOURS
Refills: 0 | Status: DISCONTINUED | OUTPATIENT
Start: 2024-05-23 | End: 2024-05-24

## 2024-05-23 RX ORDER — INSULIN HUMAN 100 [IU]/ML
1 INJECTION, SOLUTION SUBCUTANEOUS
Qty: 50 | Refills: 0 | Status: DISCONTINUED | OUTPATIENT
Start: 2024-05-23 | End: 2024-05-23

## 2024-05-23 RX ORDER — CLOPIDOGREL BISULFATE 75 MG/1
75 TABLET, FILM COATED ORAL DAILY
Refills: 0 | Status: DISCONTINUED | OUTPATIENT
Start: 2024-05-23 | End: 2024-05-25

## 2024-05-23 RX ORDER — CHLORHEXIDINE GLUCONATE 213 G/1000ML
1 SOLUTION TOPICAL DAILY
Refills: 0 | Status: DISCONTINUED | OUTPATIENT
Start: 2024-05-23 | End: 2024-05-25

## 2024-05-23 RX ORDER — PROPOFOL 10 MG/ML
5.01 INJECTION, EMULSION INTRAVENOUS
Qty: 1000 | Refills: 0 | Status: DISCONTINUED | OUTPATIENT
Start: 2024-05-23 | End: 2024-05-23

## 2024-05-23 RX ORDER — ASPIRIN/CALCIUM CARB/MAGNESIUM 324 MG
81 TABLET ORAL DAILY
Refills: 0 | Status: DISCONTINUED | OUTPATIENT
Start: 2024-05-23 | End: 2024-05-23

## 2024-05-23 RX ORDER — TIMOLOL 0.5 %
1 DROPS OPHTHALMIC (EYE)
Refills: 0 | DISCHARGE

## 2024-05-23 RX ORDER — SENNA PLUS 8.6 MG/1
2 TABLET ORAL AT BEDTIME
Refills: 0 | Status: DISCONTINUED | OUTPATIENT
Start: 2024-05-24 | End: 2024-05-25

## 2024-05-23 RX ORDER — ATORVASTATIN CALCIUM 80 MG/1
40 TABLET, FILM COATED ORAL AT BEDTIME
Refills: 0 | Status: DISCONTINUED | OUTPATIENT
Start: 2024-05-23 | End: 2024-05-25

## 2024-05-23 RX ORDER — CLOPIDOGREL BISULFATE 75 MG/1
75 TABLET, FILM COATED ORAL DAILY
Refills: 0 | Status: DISCONTINUED | OUTPATIENT
Start: 2024-05-23 | End: 2024-05-23

## 2024-05-23 RX ORDER — CEFAZOLIN SODIUM 1 G
2000 VIAL (EA) INJECTION EVERY 8 HOURS
Refills: 0 | Status: COMPLETED | OUTPATIENT
Start: 2024-05-23 | End: 2024-05-25

## 2024-05-23 RX ORDER — SODIUM CHLORIDE 9 MG/ML
1000 INJECTION INTRAMUSCULAR; INTRAVENOUS; SUBCUTANEOUS
Refills: 0 | Status: DISCONTINUED | OUTPATIENT
Start: 2024-05-23 | End: 2024-05-25

## 2024-05-23 RX ORDER — DEXTROSE 50 % IN WATER 50 %
50 SYRINGE (ML) INTRAVENOUS
Refills: 0 | Status: DISCONTINUED | OUTPATIENT
Start: 2024-05-23 | End: 2024-05-25

## 2024-05-23 RX ORDER — DEXTROSE 50 % IN WATER 50 %
25 SYRINGE (ML) INTRAVENOUS
Refills: 0 | Status: DISCONTINUED | OUTPATIENT
Start: 2024-05-23 | End: 2024-05-25

## 2024-05-23 RX ORDER — POLYETHYLENE GLYCOL 3350 17 G/17G
17 POWDER, FOR SOLUTION ORAL DAILY
Refills: 0 | Status: DISCONTINUED | OUTPATIENT
Start: 2024-05-24 | End: 2024-05-25

## 2024-05-23 RX ORDER — FENTANYL CITRATE 50 UG/ML
25 INJECTION INTRAVENOUS EVERY 4 HOURS
Refills: 0 | Status: DISCONTINUED | OUTPATIENT
Start: 2024-05-23 | End: 2024-05-23

## 2024-05-23 RX ORDER — ACETAMINOPHEN 500 MG
1000 TABLET ORAL EVERY 6 HOURS
Refills: 0 | Status: DISCONTINUED | OUTPATIENT
Start: 2024-05-23 | End: 2024-05-23

## 2024-05-23 RX ORDER — CHLORHEXIDINE GLUCONATE 213 G/1000ML
15 SOLUTION TOPICAL EVERY 12 HOURS
Refills: 0 | Status: DISCONTINUED | OUTPATIENT
Start: 2024-05-23 | End: 2024-05-23

## 2024-05-23 RX ORDER — MAGNESIUM SULFATE 500 MG/ML
2 VIAL (ML) INJECTION ONCE
Refills: 0 | Status: COMPLETED | OUTPATIENT
Start: 2024-05-23 | End: 2024-05-23

## 2024-05-23 RX ORDER — TIMOLOL 0.5 %
1 DROPS OPHTHALMIC (EYE) DAILY
Refills: 0 | Status: DISCONTINUED | OUTPATIENT
Start: 2024-05-23 | End: 2024-05-25

## 2024-05-23 RX ADMIN — ATORVASTATIN CALCIUM 40 MILLIGRAM(S): 80 TABLET, FILM COATED ORAL at 21:21

## 2024-05-23 RX ADMIN — Medication 100 MILLIGRAM(S): at 21:21

## 2024-05-23 RX ADMIN — Medication 1 DROP(S): at 22:15

## 2024-05-23 RX ADMIN — PANTOPRAZOLE SODIUM 40 MILLIGRAM(S): 20 TABLET, DELAYED RELEASE ORAL at 21:21

## 2024-05-23 RX ADMIN — Medication 25 GRAM(S): at 17:52

## 2024-05-23 RX ADMIN — HEPARIN SODIUM 5000 UNIT(S): 5000 INJECTION INTRAVENOUS; SUBCUTANEOUS at 21:21

## 2024-05-23 NOTE — H&P ADULT - NSHPPHYSICALEXAM_GEN_ALL_CORE
GEN: NAD, looks comfortable  Neuro: A&Ox3.  No focal deficits.  Moving all extremities.   CV: S1S2, regular, no murmurs appreciated.  No carotid bruits.  No JVD  Lungs: Clear B/L.  No wheezing, rales or rhonchi  ABD: Soft, non-tender, non-distended.  +Bowel sounds  EXT: Warm and well perfused.  Bilateral trace non pitting edema. Right shin with quarter sized raised skin lesion. Left posterior forearm with small skin lesion. + Bilateral varicosities. All Distal pulses palpable bilaterally.   Musculoskeletal: Moving all extremities with normal ROM, no joint swelling

## 2024-05-23 NOTE — H&P ADULT - HISTORY OF PRESENT ILLNESS
89 y/o Serbian/English speaking F, former smoker, w/ a PMH of HTN, pAF on Eliquis, CAD (s/p LHC 1/8/24 w/ significant mLAD at [facility]), LF/LG severe AS, Breast CA (s/p mastectomy/chemo in [place] 2011), skin CA s/p resection, Gout, s/p R corneal transplant 2023 (only 20% vision in R eye), and recent hospitalization 1/8-1/13 at [facility] for syncope (found to have new AF, CAD and AS), initially presented to outpatient cardiologist for routine follow up after discharge. TTE 1/9/24: LVEF 55-60%, mild LVH, mild-mod MR, mod-severe AS (PV 2.95, MG 20.63), mild TR. R/LHC on 1/10/24 at [facility]: LM normal, pLAD 40%, mLAD 70-80% (calcified), LCx luminal, RCA luminal. She presented to [facility] for recommended staged PCI of mLAD w/ BAV, prior to future TAVR. On 4/9/2024 she underwent a BAV and PCI to mLAD with Dr. Chandler and was discharged the following day. Today, she presents for a TF TAVR .     Patient seen in same day holding area; Reports no changes to PMHx or medications since last seen by our team. Denies acute or current SOB, chest pain, palpitation, N/V/D, fever/chills, recent illness, or any other concerning symptoms.

## 2024-05-23 NOTE — CHART NOTE - NSCHARTNOTEFT_GEN_A_CORE
Attending: Dr. Matias  Procedure: TF TAVR (23 mm Mitra)       Access:  B/l groin access, R radial   TVP: YES, L groin, VVI back-up  Pre-existing rhythm issues:   QRS:   Intra-op rhythm issues: Bundle Branch  Post-op rhythm issues:  QRS:   TR Band: YES, remove around 7pm        VS: /80s, HR 80s  Appearance:  well appearing, no acute distress  Neurologic:  AAOx3, no focal deficits  Cardiovascular:  RRR, no m/r/g  Respiratory:  Lungs CTAB  Gastrointestinal:  Non-distended, non-tender        Extremities: DP 1+ b/l,   Groin sites: L TVP in place, no hematoma b/l       Bed rest: YES, til groin tvp is out  Anti-platelet: Plavix/Eliquis    TTE/EKG ordered: Yes    Dispo:  Inpatient Attending: Dr. Matias  Procedure: TF TAVR (23 mm Mitra)       Access:  B/l groin access, R radial   TVP: YES, L groin, VVI back-up  Pre-existing rhythm issues: none  QRS: 76  Intra-op rhythm issues: Bundle Branch  Post-op rhythm issues:  LBBB QRS: 130  TR Band: YES, remove around 7pm        VS: /80s, HR 80s  Appearance:  well appearing, no acute distress  Neurologic:  AAOx3, no focal deficits  Cardiovascular:  RRR, no m/r/g  Respiratory:  Lungs CTAB  Gastrointestinal:  Non-distended, non-tender        Extremities: DP 1+ b/l,   Groin sites: L TVP in place, no hematoma b/l       Bed rest: YES, til groin tvp is out  Anti-platelet: Plavix/Eliquis    TTE/EKG ordered: Yes    Dispo:  Inpatient

## 2024-05-23 NOTE — H&P ADULT - NSICDXPASTSURGICALHX_GEN_ALL_CORE_FT
PAST SURGICAL HISTORY:  History of bilateral mastectomy     Post corneal transplant     S/P appendectomy     S/P skin cancer resection

## 2024-05-23 NOTE — BRIEF OPERATIVE NOTE - COMMENTS
Dr. Arvizu was the first assistant for this case including but not limited to TAVR, percutaneous    I was present for this procedure and participated as first assistant as described by the PA above, unless otherwise noted below.

## 2024-05-23 NOTE — H&P ADULT - NSHPLABSRESULTS_GEN_ALL_CORE
< from: CT Angio Chest TAVR MARGO w/wo IV Cont (01.18.24 @ 16:08) >    IMPRESSION:  1.  No evidence of hemodynamically significant stenosis of the peripheral   access vessels.  2.  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  3.  Incidental 8 mm pulmonary nodule right lower lobe.Single 6-8mm nodule   in a low or high risk patient should be followed with CT at 6-12 months,   then consider CT at 18-24 months. -- ?Reference: Guidelines for   Management of Incidental Pulmonary Nodules Detected on CT Images: From   the Fleischner Society 2017?  4.  Small left pleural effusion.    --- End of Report ---        < end of copied text >    < from: US Duplex Carotid Arteries Complete, Bilateral (04.10.24 @ 19:15) >    IMPRESSION:  1.  Hemodynamically significant stenosis of the left internal carotid   artery, greater than 70%.  2.  No significant hemodynamic stenosis of the right internal carotid   artery.  3.  Elevated velocities of the bilateral ECA, most likely stenosis.    Measurement of carotid stenosis is based on velocity parameters that   correlate the residual internal carotid diameter with that of the more   distal vessel in accordance with a method such as the North American   Symptomatic Carotid Endarterectomy Trial (NASCET).    < end of copied text >    < from: TTE Echo Complete w/ Contrast w/ Doppler (04.10.24 @ 11:19) >    CONCLUSIONS:     1. Normal left ventricular size and systolic function.   2. Mild symmetric left ventricular hypertrophy.   3. Normal right ventricular size and systolic function.   4. Normal atria.   5. Moderate aortic stenosis.   6. Mild tricuspid regurgitation.   7. No evidence of pulmonary hypertension.   8. No pericardial effusion.   9. No prior echo is available for comparison.    < end of copied text >

## 2024-05-23 NOTE — H&P ADULT - ASSESSMENT
88 y/o Ukrainian/English speaking F, former smoker, w/ a PMH of HTN, pAF on Eliquis, CAD (s/p LHC 1/8/24 w/ significant mLAD at [facility]), LF/LG severe AS, Breast CA (s/p mastectomy/chemo in [place] 2011), skin CA s/p resection, Gout, s/p R corneal transplant 2023 (only 20% vision in R eye), and recent hospitalization 1/8-1/13 at [facility] for syncope (found to have new AF, CAD and AS), initially presented to outpatient cardiologist for routine follow up after discharge. TTE 1/9/24: LVEF 55-60%, mild LVH, mild-mod MR, mod-severe AS (PV 2.95, MG 20.63), mild TR. R/LHC on 1/10/24 at [facility]: LM normal, pLAD 40%, mLAD 70-80% (calcified), LCx luminal, RCA luminal. She presented to [facility] for recommended staged PCI of mLAD w/ BAV, prior to future TAVR. On 4/9/2024 she underwent a BAV and PCI to mLAD with Dr. Chandler and was discharged the following day. Today, she presents for a TF TAVR .       Admit under   via same day surgery. Consent signed, placed on chart.  Risks/benefits reviewed, patient understands and agrees. T&S ordered and blood products placed on hold for OR.  To 9  post-op.

## 2024-05-24 ENCOUNTER — RESULT REVIEW (OUTPATIENT)
Age: 89
End: 2024-05-24

## 2024-05-24 LAB
ALBUMIN SERPL ELPH-MCNC: 3.3 G/DL — SIGNIFICANT CHANGE UP (ref 3.3–5)
ALBUMIN SERPL ELPH-MCNC: 3.4 G/DL — SIGNIFICANT CHANGE UP (ref 3.3–5)
ALP SERPL-CCNC: 54 U/L — SIGNIFICANT CHANGE UP (ref 40–120)
ALP SERPL-CCNC: 55 U/L — SIGNIFICANT CHANGE UP (ref 40–120)
ALT FLD-CCNC: 7 U/L — LOW (ref 10–45)
ALT FLD-CCNC: 9 U/L — LOW (ref 10–45)
ANION GAP SERPL CALC-SCNC: 12 MMOL/L — SIGNIFICANT CHANGE UP (ref 5–17)
ANION GAP SERPL CALC-SCNC: 12 MMOL/L — SIGNIFICANT CHANGE UP (ref 5–17)
APTT BLD: 35.1 SEC — SIGNIFICANT CHANGE UP (ref 24.5–35.6)
APTT BLD: 45.7 SEC — HIGH (ref 24.5–35.6)
AST SERPL-CCNC: 21 U/L — SIGNIFICANT CHANGE UP (ref 10–40)
AST SERPL-CCNC: 23 U/L — SIGNIFICANT CHANGE UP (ref 10–40)
BASOPHILS # BLD AUTO: 0.03 K/UL — SIGNIFICANT CHANGE UP (ref 0–0.2)
BASOPHILS # BLD AUTO: 0.04 K/UL — SIGNIFICANT CHANGE UP (ref 0–0.2)
BASOPHILS NFR BLD AUTO: 0.4 % — SIGNIFICANT CHANGE UP (ref 0–2)
BASOPHILS NFR BLD AUTO: 0.5 % — SIGNIFICANT CHANGE UP (ref 0–2)
BILIRUB SERPL-MCNC: 0.4 MG/DL — SIGNIFICANT CHANGE UP (ref 0.2–1.2)
BILIRUB SERPL-MCNC: 0.7 MG/DL — SIGNIFICANT CHANGE UP (ref 0.2–1.2)
BUN SERPL-MCNC: 10 MG/DL — SIGNIFICANT CHANGE UP (ref 7–23)
BUN SERPL-MCNC: 13 MG/DL — SIGNIFICANT CHANGE UP (ref 7–23)
CALCIUM SERPL-MCNC: 8.8 MG/DL — SIGNIFICANT CHANGE UP (ref 8.4–10.5)
CALCIUM SERPL-MCNC: 8.8 MG/DL — SIGNIFICANT CHANGE UP (ref 8.4–10.5)
CHLORIDE SERPL-SCNC: 102 MMOL/L — SIGNIFICANT CHANGE UP (ref 96–108)
CHLORIDE SERPL-SCNC: 105 MMOL/L — SIGNIFICANT CHANGE UP (ref 96–108)
CO2 SERPL-SCNC: 24 MMOL/L — SIGNIFICANT CHANGE UP (ref 22–31)
CO2 SERPL-SCNC: 25 MMOL/L — SIGNIFICANT CHANGE UP (ref 22–31)
CREAT SERPL-MCNC: 0.57 MG/DL — SIGNIFICANT CHANGE UP (ref 0.5–1.3)
CREAT SERPL-MCNC: 0.67 MG/DL — SIGNIFICANT CHANGE UP (ref 0.5–1.3)
EGFR: 84 ML/MIN/1.73M2 — SIGNIFICANT CHANGE UP
EGFR: 87 ML/MIN/1.73M2 — SIGNIFICANT CHANGE UP
EOSINOPHIL # BLD AUTO: 0.01 K/UL — SIGNIFICANT CHANGE UP (ref 0–0.5)
EOSINOPHIL # BLD AUTO: 0.1 K/UL — SIGNIFICANT CHANGE UP (ref 0–0.5)
EOSINOPHIL NFR BLD AUTO: 0.1 % — SIGNIFICANT CHANGE UP (ref 0–6)
EOSINOPHIL NFR BLD AUTO: 1.3 % — SIGNIFICANT CHANGE UP (ref 0–6)
GAS PNL BLDA: SIGNIFICANT CHANGE UP
GAS PNL BLDA: SIGNIFICANT CHANGE UP
GLUCOSE SERPL-MCNC: 106 MG/DL — HIGH (ref 70–99)
GLUCOSE SERPL-MCNC: 96 MG/DL — SIGNIFICANT CHANGE UP (ref 70–99)
HCT VFR BLD CALC: 38 % — SIGNIFICANT CHANGE UP (ref 34.5–45)
HCT VFR BLD CALC: 38.4 % — SIGNIFICANT CHANGE UP (ref 34.5–45)
HGB BLD-MCNC: 12.7 G/DL — SIGNIFICANT CHANGE UP (ref 11.5–15.5)
HGB BLD-MCNC: 12.7 G/DL — SIGNIFICANT CHANGE UP (ref 11.5–15.5)
IMM GRANULOCYTES NFR BLD AUTO: 0.3 % — SIGNIFICANT CHANGE UP (ref 0–0.9)
IMM GRANULOCYTES NFR BLD AUTO: 0.5 % — SIGNIFICANT CHANGE UP (ref 0–0.9)
INR BLD: 1.06 — SIGNIFICANT CHANGE UP (ref 0.85–1.18)
INR BLD: 1.07 — SIGNIFICANT CHANGE UP (ref 0.85–1.18)
LYMPHOCYTES # BLD AUTO: 1.24 K/UL — SIGNIFICANT CHANGE UP (ref 1–3.3)
LYMPHOCYTES # BLD AUTO: 1.25 K/UL — SIGNIFICANT CHANGE UP (ref 1–3.3)
LYMPHOCYTES # BLD AUTO: 15.7 % — SIGNIFICANT CHANGE UP (ref 13–44)
LYMPHOCYTES # BLD AUTO: 16.7 % — SIGNIFICANT CHANGE UP (ref 13–44)
MAGNESIUM SERPL-MCNC: 2 MG/DL — SIGNIFICANT CHANGE UP (ref 1.6–2.6)
MAGNESIUM SERPL-MCNC: 2.3 MG/DL — SIGNIFICANT CHANGE UP (ref 1.6–2.6)
MCHC RBC-ENTMCNC: 31.1 PG — SIGNIFICANT CHANGE UP (ref 27–34)
MCHC RBC-ENTMCNC: 31.5 PG — SIGNIFICANT CHANGE UP (ref 27–34)
MCHC RBC-ENTMCNC: 33.1 GM/DL — SIGNIFICANT CHANGE UP (ref 32–36)
MCHC RBC-ENTMCNC: 33.4 GM/DL — SIGNIFICANT CHANGE UP (ref 32–36)
MCV RBC AUTO: 93.9 FL — SIGNIFICANT CHANGE UP (ref 80–100)
MCV RBC AUTO: 94.3 FL — SIGNIFICANT CHANGE UP (ref 80–100)
MONOCYTES # BLD AUTO: 0.45 K/UL — SIGNIFICANT CHANGE UP (ref 0–0.9)
MONOCYTES # BLD AUTO: 0.63 K/UL — SIGNIFICANT CHANGE UP (ref 0–0.9)
MONOCYTES NFR BLD AUTO: 6 % — SIGNIFICANT CHANGE UP (ref 2–14)
MONOCYTES NFR BLD AUTO: 8 % — SIGNIFICANT CHANGE UP (ref 2–14)
NEUTROPHILS # BLD AUTO: 5.62 K/UL — SIGNIFICANT CHANGE UP (ref 1.8–7.4)
NEUTROPHILS # BLD AUTO: 5.93 K/UL — SIGNIFICANT CHANGE UP (ref 1.8–7.4)
NEUTROPHILS NFR BLD AUTO: 75.2 % — SIGNIFICANT CHANGE UP (ref 43–77)
NEUTROPHILS NFR BLD AUTO: 75.3 % — SIGNIFICANT CHANGE UP (ref 43–77)
NRBC # BLD: 0 /100 WBCS — SIGNIFICANT CHANGE UP (ref 0–0)
NRBC # BLD: 0 /100 WBCS — SIGNIFICANT CHANGE UP (ref 0–0)
PHOSPHATE SERPL-MCNC: 4.2 MG/DL — SIGNIFICANT CHANGE UP (ref 2.5–4.5)
PHOSPHATE SERPL-MCNC: 4.3 MG/DL — SIGNIFICANT CHANGE UP (ref 2.5–4.5)
PLATELET # BLD AUTO: 163 K/UL — SIGNIFICANT CHANGE UP (ref 150–400)
PLATELET # BLD AUTO: 164 K/UL — SIGNIFICANT CHANGE UP (ref 150–400)
POTASSIUM SERPL-MCNC: 3.9 MMOL/L — SIGNIFICANT CHANGE UP (ref 3.5–5.3)
POTASSIUM SERPL-MCNC: 4.3 MMOL/L — SIGNIFICANT CHANGE UP (ref 3.5–5.3)
POTASSIUM SERPL-SCNC: 3.9 MMOL/L — SIGNIFICANT CHANGE UP (ref 3.5–5.3)
POTASSIUM SERPL-SCNC: 4.3 MMOL/L — SIGNIFICANT CHANGE UP (ref 3.5–5.3)
PROT SERPL-MCNC: 5.6 G/DL — LOW (ref 6–8.3)
PROT SERPL-MCNC: 5.9 G/DL — LOW (ref 6–8.3)
PROTHROM AB SERPL-ACNC: 12.1 SEC — SIGNIFICANT CHANGE UP (ref 9.5–13)
PROTHROM AB SERPL-ACNC: 12.2 SEC — SIGNIFICANT CHANGE UP (ref 9.5–13)
RBC # BLD: 4.03 M/UL — SIGNIFICANT CHANGE UP (ref 3.8–5.2)
RBC # BLD: 4.09 M/UL — SIGNIFICANT CHANGE UP (ref 3.8–5.2)
RBC # FLD: 13.4 % — SIGNIFICANT CHANGE UP (ref 10.3–14.5)
RBC # FLD: 13.5 % — SIGNIFICANT CHANGE UP (ref 10.3–14.5)
SODIUM SERPL-SCNC: 138 MMOL/L — SIGNIFICANT CHANGE UP (ref 135–145)
SODIUM SERPL-SCNC: 142 MMOL/L — SIGNIFICANT CHANGE UP (ref 135–145)
WBC # BLD: 7.48 K/UL — SIGNIFICANT CHANGE UP (ref 3.8–10.5)
WBC # BLD: 7.88 K/UL — SIGNIFICANT CHANGE UP (ref 3.8–10.5)
WBC # FLD AUTO: 7.48 K/UL — SIGNIFICANT CHANGE UP (ref 3.8–10.5)
WBC # FLD AUTO: 7.88 K/UL — SIGNIFICANT CHANGE UP (ref 3.8–10.5)

## 2024-05-24 PROCEDURE — 93306 TTE W/DOPPLER COMPLETE: CPT | Mod: 26

## 2024-05-24 PROCEDURE — 71045 X-RAY EXAM CHEST 1 VIEW: CPT | Mod: 26

## 2024-05-24 PROCEDURE — 99232 SBSQ HOSP IP/OBS MODERATE 35: CPT

## 2024-05-24 PROCEDURE — 93010 ELECTROCARDIOGRAM REPORT: CPT

## 2024-05-24 RX ORDER — METOPROLOL TARTRATE 50 MG
25 TABLET ORAL DAILY
Refills: 0 | Status: DISCONTINUED | OUTPATIENT
Start: 2024-05-24 | End: 2024-05-25

## 2024-05-24 RX ORDER — POTASSIUM CHLORIDE 20 MEQ
20 PACKET (EA) ORAL ONCE
Refills: 0 | Status: COMPLETED | OUTPATIENT
Start: 2024-05-24 | End: 2024-05-24

## 2024-05-24 RX ORDER — SODIUM CHLORIDE 9 MG/ML
3 INJECTION INTRAMUSCULAR; INTRAVENOUS; SUBCUTANEOUS EVERY 8 HOURS
Refills: 0 | Status: DISCONTINUED | OUTPATIENT
Start: 2024-05-24 | End: 2024-05-25

## 2024-05-24 RX ORDER — APIXABAN 2.5 MG/1
2.5 TABLET, FILM COATED ORAL EVERY 12 HOURS
Refills: 0 | Status: DISCONTINUED | OUTPATIENT
Start: 2024-05-24 | End: 2024-05-25

## 2024-05-24 RX ORDER — ALBUMIN HUMAN 25 %
250 VIAL (ML) INTRAVENOUS ONCE
Refills: 0 | Status: COMPLETED | OUTPATIENT
Start: 2024-05-24 | End: 2024-05-24

## 2024-05-24 RX ADMIN — SODIUM CHLORIDE 3 MILLILITER(S): 9 INJECTION INTRAMUSCULAR; INTRAVENOUS; SUBCUTANEOUS at 13:01

## 2024-05-24 RX ADMIN — Medication 100 MILLIGRAM(S): at 22:19

## 2024-05-24 RX ADMIN — Medication 650 MILLIGRAM(S): at 23:00

## 2024-05-24 RX ADMIN — PANTOPRAZOLE SODIUM 40 MILLIGRAM(S): 20 TABLET, DELAYED RELEASE ORAL at 11:37

## 2024-05-24 RX ADMIN — Medication 1 DROP(S): at 17:31

## 2024-05-24 RX ADMIN — HEPARIN SODIUM 5000 UNIT(S): 5000 INJECTION INTRAVENOUS; SUBCUTANEOUS at 13:19

## 2024-05-24 RX ADMIN — Medication 650 MILLIGRAM(S): at 22:27

## 2024-05-24 RX ADMIN — SENNA PLUS 2 TABLET(S): 8.6 TABLET ORAL at 22:18

## 2024-05-24 RX ADMIN — Medication 1 DROP(S): at 05:58

## 2024-05-24 RX ADMIN — Medication 100 MILLIGRAM(S): at 13:19

## 2024-05-24 RX ADMIN — SODIUM CHLORIDE 3 MILLILITER(S): 9 INJECTION INTRAMUSCULAR; INTRAVENOUS; SUBCUTANEOUS at 22:04

## 2024-05-24 RX ADMIN — CHLORHEXIDINE GLUCONATE 1 APPLICATION(S): 213 SOLUTION TOPICAL at 11:37

## 2024-05-24 RX ADMIN — Medication 125 MILLILITER(S): at 17:38

## 2024-05-24 RX ADMIN — Medication 25 MILLIGRAM(S): at 08:34

## 2024-05-24 RX ADMIN — ATORVASTATIN CALCIUM 40 MILLIGRAM(S): 80 TABLET, FILM COATED ORAL at 22:19

## 2024-05-24 RX ADMIN — APIXABAN 2.5 MILLIGRAM(S): 2.5 TABLET, FILM COATED ORAL at 17:31

## 2024-05-24 RX ADMIN — CLOPIDOGREL BISULFATE 75 MILLIGRAM(S): 75 TABLET, FILM COATED ORAL at 11:37

## 2024-05-24 RX ADMIN — Medication 100 MILLIEQUIVALENT(S): at 08:35

## 2024-05-24 RX ADMIN — Medication 100 MILLIGRAM(S): at 05:58

## 2024-05-24 RX ADMIN — Medication 1 DROP(S): at 11:37

## 2024-05-24 RX ADMIN — HEPARIN SODIUM 5000 UNIT(S): 5000 INJECTION INTRAVENOUS; SUBCUTANEOUS at 05:58

## 2024-05-24 NOTE — PHYSICAL THERAPY INITIAL EVALUATION ADULT - GENERAL OBSERVATIONS, REHAB EVAL
Patient encountered semi-supine in bed in no apparent distress, +heplock, +tele, +pulseOx, +NC @2L/min, +b/l groin dressings clean/dry/intact. Son present at bedside. Patient encountered out of bed sitting in bedside chair in no apparent distress, +heplock, +tele, +pulseOx, +NC @2L/min, +b/l groin dressings clean/dry/intact. Son present at bedside.

## 2024-05-24 NOTE — PROGRESS NOTE ADULT - ASSESSMENT
89 y/o Kinyarwanda/English speaking F, former smoker, w/ a PMH of HTN, pAF on Eliquis, CAD (s/p LHC 1/8/24 w/ significant mLAD at [facility]), LF/LG severe AS, Breast CA (s/p mastectomy/chemo in [place] 2011), skin CA s/p resection, Gout, s/p R corneal transplant 2023 (only 20% vision in R eye), and recent hospitalization 1/8-1/13 at [facility] for syncope (found to have new AF, CAD and AS), initially presented to outpatient cardiologist for routine follow up after discharge for follow-up. TTE on 1/9/24 at the appointments showed: LVEF 55-60%, mild LVH, mild-mod MR, mod-severe AS (PV 2.95, MG 20.63), mild TR. On 1/10/24, she had a R/LHC on 1/10/24 at [facility] which showed: LM normal, pLAD 40%, mLAD 70-80% (calcified), LCx luminal, RCA luminal.. On 4/9/2024 she underwent a BAV and PCI to mLAD with Dr. Matias and was discharged the following day. She then presented to 5/23/24 for a planned TF TAVR. Intraoperatively, patient developed a LBBB. A groin TVP was left in place. She arrived to [facility] as mini-ICU not actively pacing. On POD 1, she is to receive her home dose of Toprol. If rhythm remains stable, will remove her groin TVP and step-down.      Neuro:   Pain well controlled on PRN APAP  No delirium, no focal deficits     HEENT  Hx of corneal transplant   - continue in home eye drops     Cardiac:   POD 1 s/p TF TAVR (23mm Mitra)   - Intra-op LBBB, narrowed now with groin TVP  -- plan to give Toprol and follow rhythm   -- potentially will remove groin TVP later today if rhythm remains stable on 12pm EKG   - patient will be on Plavix and Eliquis. Eliquis currently on hold given presence of groin tvp. Will resume once sheath is removed  Vital signs stable over last 24 hours   - HR: 81-91  - BP: 129-156/39-40  Atrial Fibrillation   - Eliquis currently on hold   - plan to resume once groin sheath is out  HTN  - trialing Toprol 25 mg   CAD s/p PCI in 1/2024  - on Plavix currently will resume Eliquis once able   - no more ASA 81 mg per Dr. Matias     Pulm:   Saturating well on room air   Encouraging IS   CXR: improvement compared to post-op film   - TVP in good position     GI:   Tolerating diet well   GI PPx: Protonix  Continue with bowel regimen     Renal:   Monitor I/Os   BUN/Cr stable @ 10/0.57    Heme:   H&H stable @ 12.8/38.0  DVT prophylaxis: SCDs and SQH 5000U    ID:   Afebrile, WBC stable @ 7.8  Monitor fever curve     MSK:   Encourage ambulation   PT/OT     Endocrine:   No Hx of DM or Thyroid Disease  - A1C: 5.7  - TSH: not obtained  Monitor blood glucose levels     Wound:  Monitor groins     Dispo:  Inpatient management

## 2024-05-24 NOTE — PHYSICAL THERAPY INITIAL EVALUATION ADULT - IMPAIRMENTS CONTRIBUTING TO GAIT DEVIATIONS, PT EVAL
slightly unsteady with no ep of LOB. Patient noted she felt "stiff"/impaired balance/impaired postural control

## 2024-05-24 NOTE — PHYSICAL THERAPY INITIAL EVALUATION ADULT - PERTINENT HX OF CURRENT PROBLEM, REHAB EVAL
88 y/o Armenian/English speaking F, former smoker, w/ a PMH of HTN, pAF on Eliquis, CAD (s/p [facility] 1/8/24 w/ significant mLAD at [facility]), LF/LG severe AS, Breast CA (s/p mastectomy/chemo in [place] 2011), skin CA s/p resection, Gout, s/p R corneal transplant 2023 (only 20% vision in R eye), and recent hospitalization 1/8-1/13 at [facility] for syncope (found to have new AF, CAD and AS), initially presented to outpatient cardiologist for routine follow up after discharge. Patient is now s/p TAVR on 5/23/2024.

## 2024-05-24 NOTE — PROGRESS NOTE ADULT - SUBJECTIVE AND OBJECTIVE BOX
Patient discussed on morning rounds with Dr. Fonseca/Florencio     Operation / Date: TF TAVR (23 mm gordy)     SUBJECTIVE ASSESSMENT:  89y Female who is complaining of some nausea this AM. Had one episode of emesis after breakfast today, symptoms have improved. Did get Toprol about 20 minutes prior to emesis.         Vital Signs Last 24 Hrs  T(C): 37.2 (24 May 2024 09:13), Max: 37.2 (24 May 2024 09:13)  T(F): 99 (24 May 2024 09:13), Max: 99 (24 May 2024 09:13)  HR: 91 (24 May 2024 09:00) (74 - 92)  BP: 171/85 (23 May 2024 15:27) (171/85 - 171/85)  BP(mean): --  RR: 23 (24 May 2024 09:00) (12 - 27)  SpO2: 95% (24 May 2024 09:00) (94% - 100%)    Parameters below as of 24 May 2024 09:00  Patient On (Oxygen Delivery Method): nasal cannula w/ humidification  O2 Flow (L/min): 2    I&O's Detail    23 May 2024 07:01  -  24 May 2024 07:00  --------------------------------------------------------  IN:    IV PiggyBack: 150 mL    Oral Fluid: 120 mL    sodium chloride 0.9%: 130 mL  Total IN: 400 mL    OUT:    Incontinent per Collection Bag (mL): 100 mL  Total OUT: 100 mL    Total NET: 300 mL      24 May 2024 07:01  -  24 May 2024 09:41  --------------------------------------------------------  IN:    IV PiggyBack: 100 mL    Oral Fluid: 120 mL    sodium chloride 0.9%: 30 mL  Total IN: 250 mL    OUT:    Incontinent per Collection Bag (mL): 50 mL  Total OUT: 50 mL    Total NET: 200 mL          CHEST TUBE:  NO   KIA DRAIN:  NO.  EPICARDIAL WIRES: No.  TIE DOWNS: No.  HOFF: No.  TVP: YES, L groin    PHYSICAL EXAM:    General: Sitting in bed comfortably in NAD  Neuro: A&Ox3, no focal deficits   HEENT: NCAT, EOMI   Cardiac: Regular rate and rhythm, no active pacing, normal S1 and S2. No m/r/g   Pulm: Breathing comfortably on 2L NC. No signs of respiratory distress. Lungs are CTA b/l without wheezes, rales, or rhonchi   Abdomen: Soft, non-distended, non-tender.   : No hoff, purewick in place  Extremities: Warm and well perfused, no peripheral edema, distal pulses 1+. No calf tenderness. SCDs and TEDs in place  MSK: Full AROM   Wound: Groins stable without hematoma. L groin with TVP in place, no active pacing      LABS:                        12.7   7.88  )-----------( 164      ( 24 May 2024 09:04 )             38.0     PT/INR - ( 24 May 2024 08:52 )   PT: 12.2 sec;   INR: 1.07          PTT - ( 24 May 2024 08:52 )  PTT:45.7 sec    05-24    142  |  105  |  10  ----------------------------<  96  3.9   |  25  |  0.57    Ca    8.8      24 May 2024 00:53  Phos  4.2     05-24  Mg     2.3     05-24    TPro  5.6<L>  /  Alb  3.4  /  TBili  0.4  /  DBili  x   /  AST  21  /  ALT  9<L>  /  AlkPhos  55  05-24      Urinalysis Basic - ( 24 May 2024 00:53 )    Color: x / Appearance: x / SG: x / pH: x  Gluc: 96 mg/dL / Ketone: x  / Bili: x / Urobili: x   Blood: x / Protein: x / Nitrite: x   Leuk Esterase: x / RBC: x / WBC x   Sq Epi: x / Non Sq Epi: x / Bacteria: x        MEDICATIONS  (STANDING):  atorvastatin 40 milliGRAM(s) Oral at bedtime  ceFAZolin   IVPB 2000 milliGRAM(s) IV Intermittent every 8 hours  chlorhexidine 2% Cloths 1 Application(s) Topical daily  clopidogrel Tablet 75 milliGRAM(s) Oral daily  dextrose 50% Injectable 50 milliLiter(s) IV Push every 15 minutes  dextrose 50% Injectable 25 milliLiter(s) IV Push every 15 minutes  heparin   Injectable 5000 Unit(s) SubCutaneous every 8 hours  metoprolol succinate ER 25 milliGRAM(s) Oral daily  pantoprazole  Injectable 40 milliGRAM(s) IV Push daily  polyethylene glycol 3350 17 Gram(s) Oral daily  prednisoLONE acetate 1% Suspension 1 Drop(s) Right EYE every 12 hours  senna 2 Tablet(s) Oral at bedtime  sodium chloride 0.9%. 1000 milliLiter(s) (10 mL/Hr) IV Continuous <Continuous>  timolol 0.5% Solution 1 Drop(s) Right EYE daily    MEDICATIONS  (PRN):  acetaminophen     Tablet .. 650 milliGRAM(s) Oral every 6 hours PRN Mild Pain (1 - 3)        RADIOLOGY & ADDITIONAL TESTS:

## 2024-05-24 NOTE — PHYSICAL THERAPY INITIAL EVALUATION ADULT - ONSET DATE, REHAB EVAL
23-May-2024 Niacinamide Pregnancy And Lactation Text: These medications are considered safe during pregnancy.

## 2024-05-24 NOTE — PHYSICAL THERAPY INITIAL EVALUATION ADULT - ADDITIONAL COMMENTS
Patient lives with son in private house +7STE. PTA, patient was independent with ADLs and functional mobility within the home. Patient utilizes RW and family assist for community ambulation. Patient has grab bars and shower chair.

## 2024-05-25 ENCOUNTER — TRANSCRIPTION ENCOUNTER (OUTPATIENT)
Age: 89
End: 2024-05-25

## 2024-05-25 VITALS
OXYGEN SATURATION: 95 % | SYSTOLIC BLOOD PRESSURE: 96 MMHG | DIASTOLIC BLOOD PRESSURE: 49 MMHG | RESPIRATION RATE: 22 BRPM | HEART RATE: 66 BPM

## 2024-05-25 LAB
ALBUMIN SERPL ELPH-MCNC: 3.5 G/DL — SIGNIFICANT CHANGE UP (ref 3.3–5)
ALP SERPL-CCNC: 52 U/L — SIGNIFICANT CHANGE UP (ref 40–120)
ALT FLD-CCNC: <5 U/L — LOW (ref 10–45)
ANION GAP SERPL CALC-SCNC: 7 MMOL/L — SIGNIFICANT CHANGE UP (ref 5–17)
APTT BLD: 38.7 SEC — HIGH (ref 24.5–35.6)
AST SERPL-CCNC: 17 U/L — SIGNIFICANT CHANGE UP (ref 10–40)
BILIRUB SERPL-MCNC: 0.6 MG/DL — SIGNIFICANT CHANGE UP (ref 0.2–1.2)
BUN SERPL-MCNC: 18 MG/DL — SIGNIFICANT CHANGE UP (ref 7–23)
CALCIUM SERPL-MCNC: 8.8 MG/DL — SIGNIFICANT CHANGE UP (ref 8.4–10.5)
CHLORIDE SERPL-SCNC: 106 MMOL/L — SIGNIFICANT CHANGE UP (ref 96–108)
CO2 SERPL-SCNC: 26 MMOL/L — SIGNIFICANT CHANGE UP (ref 22–31)
CREAT SERPL-MCNC: 0.72 MG/DL — SIGNIFICANT CHANGE UP (ref 0.5–1.3)
EGFR: 80 ML/MIN/1.73M2 — SIGNIFICANT CHANGE UP
GLUCOSE SERPL-MCNC: 126 MG/DL — HIGH (ref 70–99)
HCT VFR BLD CALC: 35.1 % — SIGNIFICANT CHANGE UP (ref 34.5–45)
HGB BLD-MCNC: 11.6 G/DL — SIGNIFICANT CHANGE UP (ref 11.5–15.5)
INR BLD: 1.34 — HIGH (ref 0.85–1.18)
MAGNESIUM SERPL-MCNC: 2 MG/DL — SIGNIFICANT CHANGE UP (ref 1.6–2.6)
MCHC RBC-ENTMCNC: 31.3 PG — SIGNIFICANT CHANGE UP (ref 27–34)
MCHC RBC-ENTMCNC: 33 GM/DL — SIGNIFICANT CHANGE UP (ref 32–36)
MCV RBC AUTO: 94.6 FL — SIGNIFICANT CHANGE UP (ref 80–100)
NRBC # BLD: 0 /100 WBCS — SIGNIFICANT CHANGE UP (ref 0–0)
PLATELET # BLD AUTO: 130 K/UL — LOW (ref 150–400)
POTASSIUM SERPL-MCNC: 4.2 MMOL/L — SIGNIFICANT CHANGE UP (ref 3.5–5.3)
POTASSIUM SERPL-SCNC: 4.2 MMOL/L — SIGNIFICANT CHANGE UP (ref 3.5–5.3)
PROT SERPL-MCNC: 6 G/DL — SIGNIFICANT CHANGE UP (ref 6–8.3)
PROTHROM AB SERPL-ACNC: 15.1 SEC — HIGH (ref 9.5–13)
RBC # BLD: 3.71 M/UL — LOW (ref 3.8–5.2)
RBC # FLD: 13.8 % — SIGNIFICANT CHANGE UP (ref 10.3–14.5)
SODIUM SERPL-SCNC: 139 MMOL/L — SIGNIFICANT CHANGE UP (ref 135–145)
WBC # BLD: 6.87 K/UL — SIGNIFICANT CHANGE UP (ref 3.8–10.5)
WBC # FLD AUTO: 6.87 K/UL — SIGNIFICANT CHANGE UP (ref 3.8–10.5)

## 2024-05-25 PROCEDURE — C1769: CPT

## 2024-05-25 PROCEDURE — 83880 ASSAY OF NATRIURETIC PEPTIDE: CPT

## 2024-05-25 PROCEDURE — 80053 COMPREHEN METABOLIC PANEL: CPT

## 2024-05-25 PROCEDURE — 71045 X-RAY EXAM CHEST 1 VIEW: CPT

## 2024-05-25 PROCEDURE — 85027 COMPLETE CBC AUTOMATED: CPT

## 2024-05-25 PROCEDURE — P9045: CPT

## 2024-05-25 PROCEDURE — 82330 ASSAY OF CALCIUM: CPT

## 2024-05-25 PROCEDURE — 86900 BLOOD TYPING SEROLOGIC ABO: CPT

## 2024-05-25 PROCEDURE — 36415 COLL VENOUS BLD VENIPUNCTURE: CPT

## 2024-05-25 PROCEDURE — C1894: CPT

## 2024-05-25 PROCEDURE — 85610 PROTHROMBIN TIME: CPT

## 2024-05-25 PROCEDURE — 86850 RBC ANTIBODY SCREEN: CPT

## 2024-05-25 PROCEDURE — 84100 ASSAY OF PHOSPHORUS: CPT

## 2024-05-25 PROCEDURE — 86923 COMPATIBILITY TEST ELECTRIC: CPT

## 2024-05-25 PROCEDURE — 82803 BLOOD GASES ANY COMBINATION: CPT

## 2024-05-25 PROCEDURE — 93306 TTE W/DOPPLER COMPLETE: CPT

## 2024-05-25 PROCEDURE — 84295 ASSAY OF SERUM SODIUM: CPT

## 2024-05-25 PROCEDURE — 85730 THROMBOPLASTIN TIME PARTIAL: CPT

## 2024-05-25 PROCEDURE — 84132 ASSAY OF SERUM POTASSIUM: CPT

## 2024-05-25 PROCEDURE — 71045 X-RAY EXAM CHEST 1 VIEW: CPT | Mod: 26

## 2024-05-25 PROCEDURE — 83735 ASSAY OF MAGNESIUM: CPT

## 2024-05-25 PROCEDURE — 85025 COMPLETE CBC W/AUTO DIFF WBC: CPT

## 2024-05-25 PROCEDURE — C1760: CPT

## 2024-05-25 PROCEDURE — L8699: CPT

## 2024-05-25 PROCEDURE — 93005 ELECTROCARDIOGRAM TRACING: CPT

## 2024-05-25 PROCEDURE — C1889: CPT

## 2024-05-25 PROCEDURE — 80048 BASIC METABOLIC PNL TOTAL CA: CPT

## 2024-05-25 PROCEDURE — C1887: CPT

## 2024-05-25 PROCEDURE — 97161 PT EVAL LOW COMPLEX 20 MIN: CPT

## 2024-05-25 PROCEDURE — 86901 BLOOD TYPING SEROLOGIC RH(D): CPT

## 2024-05-25 RX ORDER — ACETAMINOPHEN 500 MG
2 TABLET ORAL
Qty: 112 | Refills: 0
Start: 2024-05-25 | End: 2024-06-07

## 2024-05-25 RX ORDER — PANTOPRAZOLE SODIUM 20 MG/1
1 TABLET, DELAYED RELEASE ORAL
Qty: 30 | Refills: 0
Start: 2024-05-25 | End: 2024-06-23

## 2024-05-25 RX ORDER — CLOPIDOGREL BISULFATE 75 MG/1
1 TABLET, FILM COATED ORAL
Qty: 30 | Refills: 0
Start: 2024-05-25 | End: 2024-06-23

## 2024-05-25 RX ORDER — APIXABAN 2.5 MG/1
1 TABLET, FILM COATED ORAL
Qty: 60 | Refills: 0
Start: 2024-05-25 | End: 2024-06-23

## 2024-05-25 RX ORDER — METOPROLOL TARTRATE 50 MG
1 TABLET ORAL
Qty: 30 | Refills: 0
Start: 2024-05-25 | End: 2024-06-23

## 2024-05-25 RX ORDER — ASPIRIN/CALCIUM CARB/MAGNESIUM 324 MG
0 TABLET ORAL
Refills: 0 | DISCHARGE

## 2024-05-25 RX ORDER — ATORVASTATIN CALCIUM 80 MG/1
1 TABLET, FILM COATED ORAL
Refills: 0 | DISCHARGE

## 2024-05-25 RX ORDER — ATORVASTATIN CALCIUM 80 MG/1
1 TABLET, FILM COATED ORAL
Qty: 30 | Refills: 0
Start: 2024-05-25 | End: 2024-06-23

## 2024-05-25 RX ORDER — METOPROLOL TARTRATE 50 MG
0.5 TABLET ORAL
Refills: 0 | DISCHARGE

## 2024-05-25 RX ADMIN — Medication 650 MILLIGRAM(S): at 05:25

## 2024-05-25 RX ADMIN — CLOPIDOGREL BISULFATE 75 MILLIGRAM(S): 75 TABLET, FILM COATED ORAL at 12:55

## 2024-05-25 RX ADMIN — SODIUM CHLORIDE 3 MILLILITER(S): 9 INJECTION INTRAMUSCULAR; INTRAVENOUS; SUBCUTANEOUS at 05:13

## 2024-05-25 RX ADMIN — Medication 1 DROP(S): at 12:54

## 2024-05-25 RX ADMIN — Medication 100 MILLIGRAM(S): at 05:22

## 2024-05-25 RX ADMIN — SODIUM CHLORIDE 3 MILLILITER(S): 9 INJECTION INTRAMUSCULAR; INTRAVENOUS; SUBCUTANEOUS at 13:47

## 2024-05-25 RX ADMIN — Medication 650 MILLIGRAM(S): at 06:00

## 2024-05-25 RX ADMIN — POLYETHYLENE GLYCOL 3350 17 GRAM(S): 17 POWDER, FOR SOLUTION ORAL at 12:54

## 2024-05-25 RX ADMIN — Medication 25 MILLIGRAM(S): at 05:23

## 2024-05-25 RX ADMIN — APIXABAN 2.5 MILLIGRAM(S): 2.5 TABLET, FILM COATED ORAL at 05:24

## 2024-05-25 RX ADMIN — Medication 1 DROP(S): at 05:24

## 2024-05-25 RX ADMIN — PANTOPRAZOLE SODIUM 40 MILLIGRAM(S): 20 TABLET, DELAYED RELEASE ORAL at 12:54

## 2024-05-25 NOTE — DISCHARGE NOTE NURSING/CASE MANAGEMENT/SOCIAL WORK - PATIENT PORTAL LINK FT
You can access the FollowMyHealth Patient Portal offered by [facility] by registering at the following website: [email or web address]. By joining [facility]’s FollowMyHealth portal, you will also be able to view your health information using other applications (apps) compatible with our system.

## 2024-05-25 NOTE — DISCHARGE NOTE PROVIDER - HOSPITAL COURSE
Patient discussed on morning rounds with Dr. Arvizu (covering for Dr. Matias)     Operation Date: TF TAVR (23 mm Mitra)     Primary Surgeon/Attending MD: Dr. Matias    Referring Physician: Dr. Matias  _ _ _ _ _ _ _ _ _ _ _ _  HOSPITAL COURSE:  88 y/o Danish/English speaking F, former smoker, w/ a PMH of HTN, pAF on Eliquis, CAD (s/p LHC 1/8/24 w/ significant mLAD at [facility]), LF/LG severe AS, Breast CA (s/p mastectomy/chemo in [place] 2011), skin CA s/p resection, Gout, s/p R corneal transplant 2023 (only 20% vision in R eye), and recent hospitalization 1/8-1/13 at [facility] for syncope (found to have new AF, CAD and AS), initially presented to outpatient cardiologist for routine follow up after discharge for follow-up. TTE on 1/9/24 at the appointments showed: LVEF 55-60%, mild LVH, mild-mod MR, mod-severe AS (PV 2.95, MG 20.63), mild TR. On 1/10/24, she had a R/LHC on 1/10/24 at [facility] which showed: LM normal, pLAD 40%, mLAD 70-80% (calcified), LCx luminal, RCA luminal.. On 4/9/2024 she underwent a BAV and PCI to mLAD with Dr. Matias and was discharged the following day. She then presented to 5/23/24 for a planned TF TAVR. Intraoperatively, patient developed a LBBB. A groin TVP was left in place. She arrived to [facility] as mini-ICU not actively pacing. On POD 1, she received her home dose of Toprol and her rhythm remained stable so her groin TVP was removed without incidence. Her post-op echo was complete and showed no acute issues. She was placed back on her eliquis. Pt evaluated patient and recommended discharge home with home PT. On POD 2, she was cleared for discharge home per Dr. Arvizu. At time of discharge, she is hemodynamically stable, voiding well, ambulating without difficulty, and passing gas. She will be discharged home with home PT, on Plavix/Eliquis, and an MCOT.   _ _ _ _ _ _ _ _ _ _ _ _  DISCHARGE PHYSICAL EXAM:  General: Sitting in bed comfortably in NAD  Neuro: A&Ox3, no focal deficits   HEENT: NCAT, EOMI   Cardiac: Irregular rhtyhm, AF on monitor, rate controlled, normal S1 and S2. No m/r/g   Pulm: Breathing comfortably on RA. No signs of respiratory distress. Lungs are CTA b/l without wheezes, rales, or rhonchi   Abdomen: Soft, non-distended, non-tender. + bowel sounds   : No hoff  Extremities: Warm and well perfused, no peripheral edema, distal pulses 1+. No calf tenderness. SCDs and TEDs in place  MSK: Full AROM   Wound: Groin stable without hematoma or ecchymosis.   _ _ _ _   _ _ _ _ _ _ _ _  REMOVAL CHECKLIST:        [ N/a] Epicardial wires          [N/a ] Stitches/tie downs,   If no, why? ______          [N/a ] PICC/Midline,   If no, why? ________  _ _ _ _ _ _ _ _ _ _ _ _   MEDICATION DISCHARGE CHECKLIST        TAVR Valve        [ ] Aspirin, [ NO ] Contraindicated, Reason Not indicated per Dr. Matias _______________________________        [ YES] Plavix, [ ] Contraindicated, Reason _______________________________        Anticoagulation        [ YES] NOAC, [ ] Reason A-fib _______________________________              Cost/Insurance barriers addressed: YES/NO     _ _ _ _ _ _ _ _ _ _ _ _  RELEVANT LAB< from: TTE Echo Complete w/o Contrast w/ Doppler (05.24.24 @ 09:19) >    CONCLUSIONS:     1. Technically difficult study.   2. Hyperdynamic left ventricular systolic function.   3. Grade II left ventricular diastolic dysfunction.   4. Normal right ventricular size and systolic function.   5. A device lead is noted in the right heart.   6. Mildly dilated left atrium.   7. 23 mm Mitra 3 valve is noted in the aortic position with mild   paravalvular aortic regurgitation. The peak transvalvular velocity is   1.97 m/s, the mean transvalvular gradient is 8.00 mmHg, and the LVOT/AV   velocity ratio is 0.76. The peak transaortic gradient is 15.52 mmHg.   8. Pulmonary artery systolic pressure is 33 mmHg.   9. No pericardial effusion.  10. Compared to the previous TTE performed on 4/10/2024, patient is s/p   TAVR.    < end of copied text >    S/IMAGING:    _ _ _ _ _ _ _ _ _ _ _ _  CLINICAL FOLLOW UP NEEDS:     [ NO] Labwork         [ NO] Imaging        [YES] Home equipment           Type: [facility]            Specific needs: OT           Outpatient team aware: NO  _ _ _ _ _ _ _ _ _ _ _ _  Over 35 minutes was spent with the patient reviewing the discharge material including medications, follow up appointments, recovery, concerning symptoms, and how to contact their health care providers if they have questions

## 2024-05-25 NOTE — DISCHARGE NOTE PROVIDER - NSDCFUADDINST_GEN_ALL_CORE_FT
You had a MCOT monitor (an external cardiac rhythm monitoring device) placed on your day of discharge.  This helps us monitor your heart while you are out of the hospital for 30 days after discharge. Should your heart go into an abnormal or dangerous rhythm you will receieve a call from the MCOT team and your Structural Heart team of Doctors and PA's will be notified.    1. Keep the monitor within 30 feet of you at all times.  2. When you feel any symptom (chest pain, dizziness, palpitations, weakness, fatigue or anything outside of your normal), press the “Record Symptoms” button on the main phone of your phone  3. Shower or exercise as normal whilewearing the MCOT Patch. Do not swim or take a bath. Patch is water-resistant, not waterproof  4. When the battery is low on the phone or on the device, use the supplied . The monitor will show a warning message when the battery is low.  5. Do not remove the patch from yourskin after you begin monitoring. With normal wear, each patch should last 5 days. To replace the patch follow instructions in the MCOT box with the Patch Guide  6. Any issues with the MCOT device or phone please call Customer Service at [phone].  7. If you have any other questions at all please call the Structural Heart office at [phone]    -Walk daily as tolerated and use your incentive spirometer 10 times every hour while you are awake.     -Please weigh yourself daily. If you notice over a 3 pound weight gain in 3 days, this is a sign you are likely retaining too much fluid. It is imperative you call our right away with unexplained rapid weight gain.      -Please continue to wear the compression stockings given to you in the hospital at home. This is a way to prevent fluid from building up in your legs.     -No driving or strenuous activity/exercise until cleared by your surgeon.    -Gently clean your incisions with unscented/antibacterial soap and water, pat dry.  You may leave them open to air.    -Call your doctor if you have shortness of breath, chest pain not relieved by pain medication, dizziness, fever >101.5, or increased redness or drainage from incisions.   You had a MCOT monitor (an external cardiac rhythm monitoring device) placed on your day of discharge.  This helps us monitor your heart while you are out of the hospital for 30 days after discharge. Should your heart go into an abnormal or dangerous rhythm you will receieve a call from the MCOT team and your Structural Heart team of Doctors and PA's will be notified.     1. Keep the monitor within 30 feet of you at all times.  2. When you feel any symptom (chest pain, dizziness, palpitations, weakness, fatigue or anything outside of your normal), press the “Record Symptoms” button on the main phone of your phone  3. Shower or exercise as normal whilewearing the MCOT Patch. Do not swim or take a bath. Patch is water-resistant, not waterproof  4. When the battery is low on the phone or on the device, use the supplied . The monitor will show a warning message when the battery is low.  5. Do not remove the patch from yourskin after you begin monitoring. With normal wear, each patch should last 5 days. To replace the patch follow instructions in the MCOT box with the Patch Guide  6. Any issues with the MCOT device or phone please call Customer Service at [phone].  7. If you have any other questions at all please call the Structural Heart office at [phone]    -Walk daily as tolerated and use your incentive spirometer 10 times every hour while you are awake.     -Please weigh yourself daily. If you notice over a 3 pound weight gain in 3 days, this is a sign you are likely retaining too much fluid. It is imperative you call our right away with unexplained rapid weight gain.      -Please continue to wear the compression stockings given to you in the hospital at home. This is a way to prevent fluid from building up in your legs.     -No driving or strenuous activity/exercise until cleared by your surgeon.    -Gently clean your incisions with unscented/antibacterial soap and water, pat dry.  You may leave them open to air.    -Call your doctor if you have shortness of breath, chest pain not relieved by pain medication, dizziness, fever >101.5, or increased redness or drainage from incisions.

## 2024-05-25 NOTE — DISCHARGE NOTE NURSING/CASE MANAGEMENT/SOCIAL WORK - NSDCPEFALRISK_GEN_ALL_CORE
For information on Fall & Injury Prevention, visit: [email or web address] OR  [email or web address] OR  https://www.cdc.gov/steadi/patient.html

## 2024-05-25 NOTE — DISCHARGE NOTE PROVIDER - CARE PROVIDER_API CALL
Pasquale Matias  [address] [place] [address] Floor Cardiology   [place] [id number]  Phone: [phone]  Fax: (   )    -  Follow Up Time: 1 week

## 2024-05-25 NOTE — DISCHARGE NOTE PROVIDER - NSDCMRMEDTOKEN_GEN_ALL_CORE_FT
acetaminophen 325 mg oral tablet: 2 tab(s) orally every 6 hours as needed for Mild Pain (1 - 3)  apixaban 2.5 mg oral tablet: 1 tab(s) orally every 12 hours  atorvastatin 40 mg oral tablet: 1 tab(s) orally once a day (at bedtime)  clopidogrel 75 mg oral tablet: 1 tab(s) orally once a day  metoprolol succinate 25 mg oral tablet, extended release: 1 tab(s) orally once a day  prednisoLONE acetate 1% ophthalmic suspension: 1 drop(s) in each affected eye once a day rt eye  Protonix 40 mg oral delayed release tablet: 1 tab(s) orally once a day  timolol maleate 0.5% ophthalmic solution: 1 drop(s) in each affected eye 2 times a day

## 2024-05-26 ENCOUNTER — TRANSCRIPTION ENCOUNTER (OUTPATIENT)
Age: 89
End: 2024-05-26

## 2024-05-28 RX ORDER — APIXABAN 2.5 MG/1
2.5 TABLET, FILM COATED ORAL
Qty: 60 | Refills: 0 | Status: ACTIVE | COMMUNITY
Start: 2024-05-28

## 2024-05-28 RX ORDER — METOPROLOL SUCCINATE 25 MG/1
25 TABLET, EXTENDED RELEASE ORAL
Qty: 30 | Refills: 0 | Status: ACTIVE | COMMUNITY
Start: 2024-05-28

## 2024-05-28 RX ORDER — APIXABAN 5 MG/1
5 TABLET, FILM COATED ORAL
Qty: 60 | Refills: 3 | Status: COMPLETED | COMMUNITY
End: 2024-05-28

## 2024-05-28 RX ORDER — ATORVASTATIN CALCIUM 40 MG/1
40 TABLET, FILM COATED ORAL
Qty: 30 | Refills: 0 | Status: ACTIVE | COMMUNITY
Start: 2024-05-28

## 2024-05-28 RX ORDER — ACETAMINOPHEN 325 MG/1
325 TABLET ORAL EVERY 6 HOURS
Refills: 0 | Status: ACTIVE | COMMUNITY
Start: 2024-05-28

## 2024-05-28 RX ORDER — METOPROLOL TARTRATE 25 MG/1
25 TABLET, FILM COATED ORAL
Refills: 0 | Status: COMPLETED | COMMUNITY
End: 2024-05-28

## 2024-05-30 ENCOUNTER — APPOINTMENT (OUTPATIENT)
Dept: CARE COORDINATION | Facility: HOME HEALTH | Age: 89
End: 2024-05-30

## 2024-05-30 VITALS — RESPIRATION RATE: 16 BRPM | WEIGHT: 143 LBS | BODY MASS INDEX: 25.33 KG/M2

## 2024-05-30 DIAGNOSIS — Z95.2 PRESENCE OF PROSTHETIC HEART VALVE: ICD-10-CM

## 2024-05-30 DIAGNOSIS — I48.0 PAROXYSMAL ATRIAL FIBRILLATION: ICD-10-CM

## 2024-05-30 NOTE — HISTORY OF PRESENT ILLNESS
[Home] : at home, [unfilled] , at the time of the visit. [Other Location: e.g. Home (Enter Location, City,State)___] : at [unfilled] [Family Member] : family member [Verbal consent obtained from patient] : the patient, [unfilled] [FreeTextEntry1] : 89 y/o Croatian/English speaking F, former smoker, w/ a PMH of HTN, pAF on Eliquis, CAD (s/p LHC 1/8/24 w/ significant mLAD at [facility]), LF/LG severe AS, Breast CA (s/p mastectomy/chemo in [place] 2011), skin CA s/p resection, Gout, s/p R corneal transplant 2023 (only 20% vision in R eye), and recent hospitalization 1/8-1/13 at [facility] for syncope (found to have new AF, CAD and AS), initially presented to outpatient cardiologist for routine follow up after discharge for follow-up. TTE on 1/9/24 at the appointments showed: LVEF 55-60%, mild LVH, mild-mod MR, mod-severe AS (PV 2.95, MG 20.63), mild TR. On 1/10/24, she had a R/LHC on 1/10/24 at [facility] which showed: LM normal, pLAD 40%, mLAD 70-80% (calcified), LCx luminal, RCA luminal.. On 4/9/2024 she underwent a BAV and PCI to mLAD with Dr. Matias and was discharged the following day. She then presented to 5/23/24 for a planned TF TAVR. Intraoperatively, patient developed a LBBB. A groin TVP was left in place. She arrived to [facility] as mini-ICU not actively pacing. On POD 1, she received her home dose of Toprol and her rhythm remained stable so her groin TVP was removed without incidence. Her post-op echo was complete and showed no acute issues. She was placed back on her eliquis. Pt evaluated patient and recommended discharge home with home PT. On POD 2, she was cleared for discharge home per Dr. Arvizu. At time of discharge, she is hemodynamically stable, voiding well, ambulating without difficulty, and passing gas. She will be discharged home with home PT, on Plavix/Eliquis, and an MCOT. Ms. Norman is recovering at home with her son, she is ambulating independently.  Pt c/o feeling tired with activity, she denies dizziness, SOB, palpitations, abdominal pain, and LE swelling.  [facility] is following Ms. Norman.  [Hypertension] : Hypertension [A fib / A flutter] : A fib / A flutter [Paroxysmal] : Paroxysmal

## 2024-05-30 NOTE — ASSESSMENT
[FreeTextEntry1] : S/p TAVR- continue Apixaban, Atorvastatin, Clopidogrel, and Metoprolol Succinate.  Continue MCOT

## 2024-05-30 NOTE — PLAN
[TextEntry] : Post Operative Care:  Pt advised of importance of daily weights. Pt and son advised to call [facility] NP with weight gain of 3 lbs or more. Pt instructed on how to use incentive spirometer every hour, demonstrated proper use.  Pt encouraged to ambulate as much as tolerated, avoiding extreme temperatures outdoors.  Also advised to cleanse incisions daily with mild soap and water and to avoid lotions, powders, ointments or creams near or on the incision.  Low salt, low fat diet encouraged and discussed.  Pt advised to avoid heavy lifting or straining.     [facility] team will continue to follow up with pt status.  NP/CCC roles explained with pt understanding, contact information provided. Pt agrees to call with any questions, issues or concerns.  Worsening symptoms reviewed with patient understanding.      FOLLOW UP APPOINTMENTS:  CTS: Dr. Matias appointment 5/31/24  CARDIOLOGIST: see above   PCP: Pt encouraged to follow up within one month of discharge

## 2024-05-30 NOTE — REVIEW OF SYSTEMS
[Fever] : no fever [Chills] : no chills [Feeling Poorly] : not feeling poorly [Feeling Tired] : feeling tired [Recent Weight Gain (___ Lbs)] : no recent weight gain [Recent Weight Loss (___ Lbs)] : no recent weight loss [Negative] : Neurological

## 2024-05-30 NOTE — PHYSICAL EXAM
[Sclera] : the sclera and conjunctiva were normal [PERRL With Normal Accommodation] : pupils were equal in size, round, and reactive to light [Neck Appearance] : the appearance of the neck was normal [Respiration, Rhythm And Depth] : normal respiratory rhythm and effort [Exaggerated Use Of Accessory Muscles For Inspiration] : no accessory muscle use [Examination Of The Chest] : the chest was normal in appearance [Chest Visual Inspection Thoracic Asymmetry] : no chest asymmetry [Diminished Respiratory Excursion] : normal chest expansion [Nail Clubbing] : no clubbing  or cyanosis of the fingernails [Involuntary Movements] : no involuntary movements were seen [Skin Color & Pigmentation] : normal skin color and pigmentation [Skin Turgor] : normal skin turgor [FreeTextEntry1] : Trace b/l LE edema on visualization   [] : no rash [No Focal Deficits] : no focal deficits

## 2024-06-03 DIAGNOSIS — I48.0 PAROXYSMAL ATRIAL FIBRILLATION: ICD-10-CM

## 2024-06-03 DIAGNOSIS — Z79.02 LONG TERM (CURRENT) USE OF ANTITHROMBOTICS/ANTIPLATELETS: ICD-10-CM

## 2024-06-03 DIAGNOSIS — I35.0 NONRHEUMATIC AORTIC (VALVE) STENOSIS: ICD-10-CM

## 2024-06-03 DIAGNOSIS — Z79.01 LONG TERM (CURRENT) USE OF ANTICOAGULANTS: ICD-10-CM

## 2024-06-03 DIAGNOSIS — M10.9 GOUT, UNSPECIFIED: ICD-10-CM

## 2024-06-03 DIAGNOSIS — I25.10 ATHEROSCLEROTIC HEART DISEASE OF NATIVE CORONARY ARTERY WITHOUT ANGINA PECTORIS: ICD-10-CM

## 2024-06-03 DIAGNOSIS — I44.7 LEFT BUNDLE-BRANCH BLOCK, UNSPECIFIED: ICD-10-CM

## 2024-06-03 DIAGNOSIS — Z00.6 ENCOUNTER FOR EXAMINATION FOR NORMAL COMPARISON AND CONTROL IN CLINICAL RESEARCH PROGRAM: ICD-10-CM

## 2024-06-03 DIAGNOSIS — Z87.891 PERSONAL HISTORY OF NICOTINE DEPENDENCE: ICD-10-CM

## 2024-06-03 DIAGNOSIS — Z95.5 PRESENCE OF CORONARY ANGIOPLASTY IMPLANT AND GRAFT: ICD-10-CM

## 2024-06-03 DIAGNOSIS — Z91.048 OTHER NONMEDICINAL SUBSTANCE ALLERGY STATUS: ICD-10-CM

## 2024-06-03 DIAGNOSIS — Z90.13 ACQUIRED ABSENCE OF BILATERAL BREASTS AND NIPPLES: ICD-10-CM

## 2024-06-03 DIAGNOSIS — Z79.82 LONG TERM (CURRENT) USE OF ASPIRIN: ICD-10-CM

## 2024-06-03 DIAGNOSIS — Z85.3 PERSONAL HISTORY OF MALIGNANT NEOPLASM OF BREAST: ICD-10-CM

## 2024-06-03 DIAGNOSIS — I10 ESSENTIAL (PRIMARY) HYPERTENSION: ICD-10-CM

## 2024-07-26 NOTE — HISTORY OF PRESENT ILLNESS
[FreeTextEntry1] : 89 Y female with PMH of HTN, L-breast cancer (s/p mastectomy/chemo 2011), skin cancer, R corneal transplant (2023), w/ admission 1/8-1/13/24 for syncope and was found to have new afib (not on AC) and severe LFLG aortic stenosis who underwent a BAV and PCI to mLAD on 4/9/2024 s/p transfemoral TAVR on 5/23/2024 (Mitra 23mm) who presents for follow up.    Pt was admitted 1/8 after e/o syncope and fall at home. Patient is AAOx3, and able to provide hx w/supplementation by son, Jose. Pt reports she was in the kitchen just finished making pizza for her son. Stove was off and she had finished cleaning up, then she felt that her blood pressure was dropping, followed by a sudden LOC and fell and hit her head. Pt denies any prodrome or symptoms prior to event including CP, palp, SOB. No witness at time of fall. Neighbor came and found her on the ground, and she regained consciousness shortly after. Pt alert and oriented with no neurologic deficit upon arousal. Patient has reported history of hypotension and fainting attacks (last one when she was in [place] 3 years ago when she was suffering from COVID). Patient was admitted to telemetry at [facility] for syncope work up. EKG on admission was consistent with a-fib. Troponin were negative and CT head and CT cervical spine were negative for any acute pathology. TTE on 1/9/24 demonstrated normal LV function but concerning for mod-severe AS. Patient underwent left and right cardiac cath which revealed significant mid LAD lesion and low flow low gradient severe aortic stenosis. It was recommended at that time pt be transferred to [facility] for further workup and possible intervention for AV disease, however pt and son declined as pt was feeling distraught from being in hospital but were amenable to close outpt f/u.  The patient underwent a planned BAV and PCI of mLAD on 4/9/2024.    The patient underwent a TF TAVR on 5/23/2024 with Mitra 23 mm valve.  The patient developed a new left bundle branch block during the procedure.  The patient was monitored and was discharged home on post op day 2 with MCOT.  The patient was discharged home on Eliquis and Plavix.   The patient wore the MCOT for 25 days which showed Afib, first degree heart block, and 4 episodes of 2nd degree heart block.    Since her last visit, the patient states...  The patient is scheduled for an ECHO/EKG today.  Patient is a retired , lives with her son in a first floor apartment. She is a , independent with her basic ADLs and needs help with iADLs, does not use any assist device when walking albeit could benefit from having one. Her son is inquiring for a  that can assist them in getting help for his mom.      [Diabetes Mellitus] : no Diabetes Melllitus [Dyslipidemia] : no dyslipidemia [Dialysis] : no dialysis [Hypertension] : Hypertension [Infectious Endocarditis] : no infectious endocarditis [Home Oxygen] : no home oxygen use [Inhaled Medication Therapy] : no inhaled medication therapy [Sleep Apnea] : no sleep apnea [Liver Disease] : no liver disease [Immunocompromise Present] : not immunocompromised [Peripheral Artery Disease] : no peripheral artery disease [Unresponsive Neurologic State] : not in a unresponsive neurologic state [Syncope] : Syncope [Cerebrovascular Disease] : no cerebrovascular disease [Prior CVA] : with no prior CVA [CVD TIA] : no CVD Tia [Prior Myocardial Infarction] : No prior myocardial infarction [Anginal Classification within 2 wks] : Angina over the last 2 weeks [No angina, No symptoms] : No symptoms of [Heart Failure within 2 Weeks] : Heart Failure in last 2 weeks [Class III] : Class III [V tach / V fib] :  but no V tach/V fib [2nd Degree Heart Block] : no second degree heart block [Sick Sinus Syndrome] : no sick sinus syndrome [3rd Degree Heart Block] : no third degree heart block [A fib / A flutter] : A fib / A flutter [Paroxysmal] : Paroxysmal

## 2024-07-26 NOTE — PHYSICAL EXAM
[General Appearance - Alert] : alert [General Appearance - In No Acute Distress] : in no acute distress [General Appearance - Well Developed] : well developed [General Appearance - Well Nourished] : well nourished [Sclera] : the sclera and conjunctiva were normal [Respiration, Rhythm And Depth] : normal respiratory rhythm and effort [Exaggerated Use Of Accessory Muscles For Inspiration] : no accessory muscle use [Auscultation Breath Sounds / Voice Sounds] : lungs were clear to auscultation bilaterally [Heart Rate And Rhythm] : heart rate was normal and rhythm regular [Heart Sounds] : normal S1 and S2 [Heart Sounds Gallop] : no gallops [Heart Sounds Pericardial Friction Rub] : no pericardial rub [Systolic grade ___/6] : A grade [unfilled]/6 systolic murmur was heard. [FreeTextEntry1] : trace BLE edema [Examination Of The Chest] : the chest was normal in appearance [Chest Visual Inspection Thoracic Asymmetry] : no chest asymmetry [Bowel Sounds] : normal bowel sounds [Abdomen Soft] : soft [Abdomen Tenderness] : non-tender [Abdomen Mass (___ Cm)] : no abdominal mass palpated [Nail Clubbing] : no clubbing  or cyanosis of the fingernails [Skin Color & Pigmentation] : normal skin color and pigmentation [] : no rash [Skin Lesions] : no skin lesions [No Focal Deficits] : no focal deficits [Oriented To Time, Place, And Person] : oriented to person, place, and time [Impaired Insight] : insight and judgment were intact

## 2024-07-26 NOTE — RESULTS/DATA
[TextEntry] : - EKG ([facility] 01/08/24): Afib w/HR 99bpm, no ischemic changes - TTE ([facility] 01/09/24): LVEF 55-60%. Mild LVH. Mod MAC. Mild-mod MR. Mod AS (PV 2.95, MG 20.63) but appeared more severe visually. Mild TR. - LHC/RHC ([facility] 01/10/24): LM normal. 40% pLAD stenosis. Calcified 70-80% mLAD stenosis. LI of LCx and RCA (dominant). RA 8, RV 34/7, PA 33/19 (25), PCWP 18, LVEDP 12. . TD CO/CI 5/2.87. Arina CO/CI 5.25/3.02. AV MG 39.5. GREGOR 0.86 cm2. SVI 33 mL/m2 - Structural CT ([facility] 01/18/24): Calcified trileaflet AV. Calcific plaque obscure pLAD lumen, unable to r/o significant stenosis. Remaining segments non-obstructive. - EKG ([facility] 2/4/24): SR rate 64, OK 164ms, QRS 72, QTc 397, no ischemic changes.

## 2024-07-29 ENCOUNTER — APPOINTMENT (OUTPATIENT)
Dept: CARDIOTHORACIC SURGERY | Facility: CLINIC | Age: 89
End: 2024-07-29

## 2024-08-08 ENCOUNTER — OUTPATIENT (OUTPATIENT)
Dept: OUTPATIENT SERVICES | Facility: HOSPITAL | Age: 89
LOS: 1 days | End: 2024-08-08
Payer: MEDICARE

## 2024-08-08 ENCOUNTER — RESULT REVIEW (OUTPATIENT)
Age: 89
End: 2024-08-08

## 2024-08-08 DIAGNOSIS — Z90.49 ACQUIRED ABSENCE OF OTHER SPECIFIED PARTS OF DIGESTIVE TRACT: Chronic | ICD-10-CM

## 2024-08-08 DIAGNOSIS — I35.0 NONRHEUMATIC AORTIC (VALVE) STENOSIS: ICD-10-CM

## 2024-08-08 DIAGNOSIS — Z90.13 ACQUIRED ABSENCE OF BILATERAL BREASTS AND NIPPLES: Chronic | ICD-10-CM

## 2024-08-08 DIAGNOSIS — D86.9 SARCOIDOSIS, UNSPECIFIED: ICD-10-CM

## 2024-08-08 DIAGNOSIS — Z98.890 OTHER SPECIFIED POSTPROCEDURAL STATES: Chronic | ICD-10-CM

## 2024-08-08 DIAGNOSIS — Z94.7 CORNEAL TRANSPLANT STATUS: Chronic | ICD-10-CM

## 2024-08-08 DIAGNOSIS — I10 ESSENTIAL (PRIMARY) HYPERTENSION: ICD-10-CM

## 2024-08-08 PROCEDURE — 93306 TTE W/DOPPLER COMPLETE: CPT | Mod: 26

## 2024-08-08 PROCEDURE — C8929: CPT

## 2024-08-14 DIAGNOSIS — I35.9 NONRHEUMATIC AORTIC VALVE DISORDER, UNSPECIFIED: ICD-10-CM

## 2025-01-09 NOTE — DISCHARGE NOTE PROVIDER - PROVIDER TOKENS
FREE:[LAST:[Florencio],FIRST:[Pasquale],PHONE:[phone]],FAX:[phone]    -],ADDRESS:[[address] [place] [address]],FOLLOWUP:[1 week]] none

## (undated) DEVICE — DRAPE ULTRASOUND TRANSDUCER COVER

## (undated) DEVICE — PACING CABLE (BROWN) A/V TEMP SCREW DOWN 12FT

## (undated) DEVICE — DRAPE PROBE COVER 5" X 96"

## (undated) DEVICE — SUT PROLENE 6-0 30" RB-2

## (undated) DEVICE — SUT TICRON 2-0 36" CV-316 DA

## (undated) DEVICE — DRSG MEPILEX 10 X 25CM (4 X 10") AG

## (undated) DEVICE — TUBING SUCTION NONCONDUCTIVE 6MM X 12FT

## (undated) DEVICE — TUBING EXTENSION HI PRESSURE FLEX 48"

## (undated) DEVICE — CATH NG SALEM SUMP 16FR

## (undated) DEVICE — CHEST DRAIN PLEUR-EVAC DRY/WET ADULT-PEDS SINGLE (QUICK)

## (undated) DEVICE — PREP SCRUB BRUSH W CHG 4%

## (undated) DEVICE — CATH CV TRAY INSR ST UNIV

## (undated) DEVICE — SUT PLEDGET 9MM X 4MM X 1.5MM

## (undated) DEVICE — SUT PROLENE 3-0 36" SH-1

## (undated) DEVICE — DRSG QUICKCLOT HEMOSTATIC 4X4 FOIL

## (undated) DEVICE — SUT VICRYL 2-0 27" CT-1

## (undated) DEVICE — SUT DOUBLE 6 WIRE STERNAL

## (undated) DEVICE — PACK HYBRID LHH

## (undated) DEVICE — WARMING BLANKET FULL UNDERBODY

## (undated) DEVICE — SUMP INTRACARDIAC/PERICARDIAL 20FR 1/4" ADULT

## (undated) DEVICE — Device

## (undated) DEVICE — DRAPE IOBAN 33" X 23"

## (undated) DEVICE — SUT HOLDER INSERT FOR OCTOBASE STERNAL RETRACTOR

## (undated) DEVICE — SUT STAINLESS STEEL 7 4-18" CCS

## (undated) DEVICE — POSITIONER FOAM EGG CRATE ULNAR 2PCS (PINK)

## (undated) DEVICE — SUT SILK 5-0 60" TIES

## (undated) DEVICE — PACK PROC CV DRAPE

## (undated) DEVICE — SUT NUROLON 1 18" OS-8 (POP-OFF)

## (undated) DEVICE — DRSG BIOPATCH DISK W CHG 1" W 4.0MM HOLE

## (undated) DEVICE — FOLEY TRAY 16FR 5CC LF LUBRISIL ADVANCE TEMP CLOSED

## (undated) DEVICE — BAND RADIAL R 24CM REG

## (undated) DEVICE — DRAPE OR CAMERA COVER

## (undated) DEVICE — DRAPE SLUSH / WARMER 44 X 66"

## (undated) DEVICE — PACK OPEN HEART LNX

## (undated) DEVICE — SUT ETHIBOND 3-0 36" RB-1

## (undated) DEVICE — RIGID ADULT SUCKER

## (undated) DEVICE — NDL PERCU ECHOTIP 21G X 4CM

## (undated) DEVICE — DRAPE SURGICAL #1010

## (undated) DEVICE — SYR MED A2000 SYRINGE KIT ACIST REUS

## (undated) DEVICE — SUT VICRYL 1 36" CTX UNDYED

## (undated) DEVICE — SUT PLEDGET SOFT MEDIUM 1/4" X 1/8" X 1/16" X6

## (undated) DEVICE — SUT VICRYL 4-0 18" PS-2 UNDYED

## (undated) DEVICE — SYS DEL CONTROLLER ANGIOTOUCH

## (undated) DEVICE — SUT PROLENE 4-0 36" RB-1

## (undated) DEVICE — TUBING IV EXTENSION 30"

## (undated) DEVICE — SUT SILK 2-0 18" SH (POP-OFF)

## (undated) DEVICE — TOURNIQUET SET 12FR (1 RED, 1 BLUE, 3 CLEAR, 1 SNARE) 7"

## (undated) DEVICE — SUT PROLENE 5-0 24" RB-1

## (undated) DEVICE — SUT VICRYL 0 27" CT

## (undated) DEVICE — DRSG TRACH DRAINAGE 4X4

## (undated) DEVICE — MARKING PEN W RULER

## (undated) DEVICE — ELCTR ZOLL DEFIBRILLATOR PAD NO REPLACEMENT

## (undated) DEVICE — CATH CARDIAC RT CUSET 601201319

## (undated) DEVICE — SUT STAINLESS STEEL 6 4-18" CCS

## (undated) DEVICE — MANIFOLD ANGIO AUTOMD TRNDCR